# Patient Record
Sex: FEMALE | Race: OTHER | Employment: FULL TIME | URBAN - METROPOLITAN AREA
[De-identification: names, ages, dates, MRNs, and addresses within clinical notes are randomized per-mention and may not be internally consistent; named-entity substitution may affect disease eponyms.]

---

## 2019-04-07 ENCOUNTER — APPOINTMENT (OUTPATIENT)
Dept: GENERAL RADIOLOGY | Age: 34
End: 2019-04-07

## 2019-04-07 ENCOUNTER — HOSPITAL ENCOUNTER (EMERGENCY)
Age: 34
Discharge: HOME | End: 2019-04-07

## 2019-04-07 VITALS
TEMPERATURE: 97.8 F | DIASTOLIC BLOOD PRESSURE: 85 MMHG | WEIGHT: 205 LBS | SYSTOLIC BLOOD PRESSURE: 116 MMHG | HEIGHT: 69 IN | HEART RATE: 71 BPM | RESPIRATION RATE: 13 BRPM | OXYGEN SATURATION: 99 % | BODY MASS INDEX: 30.36 KG/M2

## 2019-04-07 DIAGNOSIS — M54.50 ACUTE EXACERBATION OF CHRONIC LOW BACK PAIN: Primary | ICD-10-CM

## 2019-04-07 DIAGNOSIS — Z86.69 HISTORY OF SCIATICA: ICD-10-CM

## 2019-04-07 DIAGNOSIS — G89.29 ACUTE EXACERBATION OF CHRONIC LOW BACK PAIN: Primary | ICD-10-CM

## 2019-04-07 LAB
BACTERIA: ABNORMAL /HPF
BILIRUBIN URINE: NEGATIVE
BLOOD, URINE: ABNORMAL
CLARITY: ABNORMAL
COLOR: YELLOW
EPITHELIAL CELLS, UA: 2 /HPF (ref 0–5)
GLUCOSE URINE: NEGATIVE MG/DL
HCG(URINE) PREGNANCY TEST: NEGATIVE
HYALINE CASTS: 2 /LPF (ref 0–8)
KETONES, URINE: NEGATIVE MG/DL
LEUKOCYTE ESTERASE, URINE: NEGATIVE
MICROSCOPIC EXAMINATION: YES
NITRITE, URINE: NEGATIVE
PH UA: 8 (ref 5–8)
PROTEIN UA: NEGATIVE MG/DL
RBC UA: 0 /HPF (ref 0–4)
SPECIFIC GRAVITY UA: 1.02 (ref 1–1.03)
URINE REFLEX TO CULTURE: ABNORMAL
URINE TYPE: ABNORMAL
UROBILINOGEN, URINE: 0.2 E.U./DL
WBC UA: 1 /HPF (ref 0–5)

## 2019-04-07 PROCEDURE — 84703 CHORIONIC GONADOTROPIN ASSAY: CPT

## 2019-04-07 PROCEDURE — 72100 X-RAY EXAM L-S SPINE 2/3 VWS: CPT

## 2019-04-07 PROCEDURE — 99283 EMERGENCY DEPT VISIT LOW MDM: CPT

## 2019-04-07 PROCEDURE — 6370000000 HC RX 637 (ALT 250 FOR IP): Performed by: PHYSICIAN ASSISTANT

## 2019-04-07 PROCEDURE — 81001 URINALYSIS AUTO W/SCOPE: CPT

## 2019-04-07 RX ORDER — PREDNISONE 20 MG/1
60 TABLET ORAL ONCE
Status: COMPLETED | OUTPATIENT
Start: 2019-04-07 | End: 2019-04-07

## 2019-04-07 RX ORDER — METHOCARBAMOL 500 MG/1
750 TABLET, FILM COATED ORAL ONCE
Status: COMPLETED | OUTPATIENT
Start: 2019-04-07 | End: 2019-04-07

## 2019-04-07 RX ORDER — LIDOCAINE 4 G/G
1 PATCH TOPICAL DAILY
Status: DISCONTINUED | OUTPATIENT
Start: 2019-04-07 | End: 2019-04-07 | Stop reason: HOSPADM

## 2019-04-07 RX ORDER — HYDROCODONE BITARTRATE AND ACETAMINOPHEN 5; 325 MG/1; MG/1
1 TABLET ORAL ONCE
Status: COMPLETED | OUTPATIENT
Start: 2019-04-07 | End: 2019-04-07

## 2019-04-07 RX ORDER — PREDNISONE 10 MG/1
TABLET ORAL
Qty: 30 TABLET | Refills: 0 | Status: SHIPPED | OUTPATIENT
Start: 2019-04-07 | End: 2019-04-17

## 2019-04-07 RX ORDER — LIDOCAINE 50 MG/G
1 PATCH TOPICAL DAILY
Qty: 30 PATCH | Refills: 0 | Status: SHIPPED | OUTPATIENT
Start: 2019-04-07 | End: 2020-09-09 | Stop reason: ALTCHOICE

## 2019-04-07 RX ORDER — HYDROCODONE BITARTRATE AND ACETAMINOPHEN 5; 325 MG/1; MG/1
1 TABLET ORAL EVERY 6 HOURS PRN
Qty: 8 TABLET | Refills: 0 | Status: SHIPPED | OUTPATIENT
Start: 2019-04-07 | End: 2019-04-09

## 2019-04-07 RX ORDER — METHOCARBAMOL 750 MG/1
750 TABLET, FILM COATED ORAL EVERY 8 HOURS PRN
Qty: 30 TABLET | Refills: 0 | Status: SHIPPED | OUTPATIENT
Start: 2019-04-07 | End: 2019-04-17

## 2019-04-07 RX ADMIN — METHOCARBAMOL TABLETS 750 MG: 500 TABLET, COATED ORAL at 15:11

## 2019-04-07 RX ADMIN — PREDNISONE 60 MG: 20 TABLET ORAL at 15:13

## 2019-04-07 RX ADMIN — HYDROCODONE BITARTRATE AND ACETAMINOPHEN 1 TABLET: 5; 325 TABLET ORAL at 15:13

## 2019-04-07 ASSESSMENT — ENCOUNTER SYMPTOMS
DIARRHEA: 0
VOMITING: 0
ABDOMINAL PAIN: 0
CHEST TIGHTNESS: 0
SHORTNESS OF BREATH: 0
COLOR CHANGE: 0
NAUSEA: 0
BACK PAIN: 1

## 2019-04-07 ASSESSMENT — PAIN SCALES - GENERAL
PAINLEVEL_OUTOF10: 9
PAINLEVEL_OUTOF10: 9

## 2019-04-07 ASSESSMENT — PAIN DESCRIPTION - PAIN TYPE: TYPE: ACUTE PAIN

## 2019-04-07 ASSESSMENT — PAIN DESCRIPTION - LOCATION: LOCATION: BACK

## 2019-04-07 NOTE — ED PROVIDER NOTES
further questioning patient states that she is late on her last menstrual period. She states she is not sure if there is a possibility of pregnancy. Patient goes on to report she is not having fevers and or chills. She is denying hematuria and/or flank pain. She denies frequency urgency and/or any other associated symptoms at the present time. PastMedical/Surgical History:      Diagnosis Date    Anxiety     Chronic back pain greater than 3 months duration     Depression          Procedure Laterality Date     SECTION         Medications:  Previous Medications    ACETAMINOPHEN (APAP EXTRA STRENGTH) 500 MG TABLET    Take 1 tablet by mouth every 6 hours as needed for Pain         Review of Systems:  Review of Systems   Constitutional: Negative for activity change, chills and fever. Respiratory: Negative for chest tightness and shortness of breath. Cardiovascular: Negative for chest pain. Gastrointestinal: Negative for abdominal pain, diarrhea, nausea and vomiting. Genitourinary: Negative for dysuria and flank pain. Musculoskeletal: Positive for back pain and myalgias. Negative for arthralgias, gait problem, joint swelling, neck pain and neck stiffness. Skin: Negative for color change and wound. Neurological: Negative for dizziness, seizures, syncope, numbness and headaches. Positives and Pertinent negatives as per HPI. Except as noted above in the ROS, problem specific ROS was completed and is negative. Physical Exam:  Physical Exam   Constitutional: She is oriented to person, place, and time. She appears well-developed and well-nourished. No distress. HENT:   Head: Normocephalic and atraumatic. Right Ear: External ear normal.   Left Ear: External ear normal.   Eyes: Conjunctivae are normal. Right eye exhibits no discharge. Left eye exhibits no discharge. No scleral icterus. Neck: Normal range of motion. No JVD present. Cardiovascular: Normal rate and regular rhythm. Exam reveals no gallop and no friction rub. No murmur heard. Pulmonary/Chest: Effort normal and breath sounds normal. No accessory muscle usage. No respiratory distress. She has no wheezes. She has no rhonchi. She has no rales. Musculoskeletal:        Lumbar back: She exhibits decreased range of motion, tenderness, bony tenderness and pain. She exhibits no swelling, no edema, no deformity, no laceration, no spasm and normal pulse. Neurological: She is alert and oriented to person, place, and time. She has normal strength. No cranial nerve deficit or sensory deficit. Coordination normal. GCS eye subscore is 4. GCS verbal subscore is 5. GCS motor subscore is 6. Skin: Skin is warm, dry and intact. No rash noted. She is not diaphoretic. Psychiatric: She has a normal mood and affect. Her behavior is normal.   Nursing note and vitals reviewed.       MEDICAL DECISION MAKING    Vitals:    Vitals:    04/07/19 1321   BP: 116/85   Pulse: 71   Resp: 13   Temp: 97.8 °F (36.6 °C)   TempSrc: Infrared   SpO2: 99%   Weight: 205 lb (93 kg)   Height: 5' 9\" (1.753 m)       LABS:  Labs Reviewed   URINE RT REFLEX TO CULTURE - Abnormal; Notable for the following components:       Result Value    Clarity, UA CLOUDY (*)     Blood, Urine SMALL (*)     All other components within normal limits    Narrative:     Performed at:  [facility] [address], [place], [address] [employer]   Phone [phone]   MICROSCOPIC URINALYSIS - Abnormal; Notable for the following components:    Bacteria, UA RARE (*)     All other components within normal limits    Narrative:     Performed at:  [facility] [address], [place], [address] [employer]   Phone [phone]   PREGNANCY, URINE    Narrative:     Performed at:  [facility] [address], [place], [facility] [employer]   Phone [phone] of labs reviewed and werenegative at this time or not returned at the time of this note. RADIOLOGY:   Non-plain film images such as CT, Ultrasound and MRI are read by the radiologist. Ana Wilson PA-C have directly visualized the radiologic plain film image(s) with the below findings:        Interpretation per the Radiologist below, if available at the time of thisnote:    XR LUMBAR SPINE (2-3 VIEWS)   Final Result   Negative study. Xr Lumbar Spine (2-3 Views)    Result Date: 4/7/2019  EXAMINATION: 3 XRAY VIEWS OF THE LUMBAR SPINE 4/7/2019 1:37 pm COMPARISON: None. HISTORY: ORDERING SYSTEM PROVIDED HISTORY: back pain TECHNOLOGIST PROVIDED HISTORY: Reason for exam:->back pain Ordering Physician Provided Reason for Exam: back pain Acuity: Acute Type of Exam: Initial FINDINGS: Vertebral body height and alignment is preserved. Intervertebral disc space height is preserved. No significant degenerative change noted. Soft tissues appear grossly unremarkable. Negative study. MEDICAL DECISION MAKING / ED COURSE:      PROCEDURES:   Procedures  None    Patient was given:     Medications   lidocaine 4 % external patch 1 patch (has no administration in time range)   methocarbamol (ROBAXIN) tablet 750 mg (has no administration in time range)   predniSONE (DELTASONE) tablet 60 mg (has no administration in time range)   HYDROcodone-acetaminophen (NORCO) 5-325 MG per tablet 1 tablet (has no administration in time range)       The patient's detailed history of present illness is documented as above. Upon arrival to the emergency department the patient's vital signs are as documented. The patient is noted to be hemodynamically stable and afebrile. Physical examination findings are as above. I had ordered a urinalysis as well as urine pregnancy test causes of the possibility of pregnancy. Despite that radiograph Saturday been completed and the patient's lumbar spine. UA demonstrates no evidence of infection and she is not noted to be pregnant.

## 2019-04-07 NOTE — ED NOTES
Pt Discharged in stable condition, VSS, no signs of distress, discharge instructions and meds reviewed. Pt verbalizes understanding and states no further questions or concerns unaddressed. Pt taken to car via wheelchair by Ronny Samson. Being driven home by family.      Calvin Varela RN  04/07/19 6023

## 2020-01-06 ENCOUNTER — HOSPITAL ENCOUNTER (EMERGENCY)
Age: 35
Discharge: HOME | End: 2020-01-06

## 2020-01-06 VITALS
BODY MASS INDEX: 27.93 KG/M2 | RESPIRATION RATE: 14 BRPM | WEIGHT: 195.06 LBS | HEART RATE: 73 BPM | SYSTOLIC BLOOD PRESSURE: 128 MMHG | DIASTOLIC BLOOD PRESSURE: 69 MMHG | TEMPERATURE: 97.3 F | HEIGHT: 70 IN

## 2020-01-06 PROCEDURE — 99282 EMERGENCY DEPT VISIT SF MDM: CPT

## 2020-01-06 PROCEDURE — 4500000021 HC ED LEVEL 1 PROCEDURE

## 2020-01-06 NOTE — ED PROVIDER NOTES
**EVALUATED BY ADVANCED PRACTICE PROVIDER**        [address] ENCOUNTER      Pt Name: Jo Garcia  [facility]:[id number]  [place] 1985  Date of evaluation: 2020  Provider: Mil Hurley PA-C      Chief Complaint:    Chief Complaint   Patient presents with    Ear Problem     c/o left ear itching, x 1 week. pt states \" I can't hear out of ear\". Nursing Notes, Past Medical Hx, Past Surgical Hx, Social Hx, Allergies, and Family Hx were all reviewed and agreed with or any disagreements were addressed in the HPI.    HPI:  (Location, Duration, Timing, Severity, Quality, Assoc Sx, Context, Modifying factors)  This is a  36 y.o. female the presents to the emergency department with a chief complaint of left ear itching and decreased hearing out of that ear. She states years ago she had cerumen impaction that had to be removed by her doctor and this feels similar. She was trying to use candle removal for her earwax the other night that got some out. She denies using Q-tips. Denies pain, fevers, vomiting or any other symptoms. PastMedical/Surgical History:      Diagnosis Date    Anxiety     Chronic back pain greater than 3 months duration     Depression          Procedure Laterality Date     SECTION         Medications:  Previous Medications    ACETAMINOPHEN (APAP EXTRA STRENGTH) 500 MG TABLET    Take 1 tablet by mouth every 6 hours as needed for Pain    LIDOCAINE (LIDODERM) 5 %    Place 1 patch onto the skin daily 12 hours on, 12 hours off. MULTIPLE VITAMIN (MULTI-VITAMIN DAILY PO)    Take by mouth         Review of Systems:  Review of Systems  Positives and Pertinent negatives as per HPI. Except as noted above in the ROS, problem specific ROS was completed and is negative. Physical Exam:  Physical Exam  Vitals signs and nursing note reviewed. Constitutional:       Appearance: She is well-developed.  She is not diaphoretic. HENT:      Head: Atraumatic. Right Ear: Tympanic membrane normal. There is no impacted cerumen. Left Ear: Tympanic membrane normal. There is impacted cerumen. Ears:      Comments: Left ear with cerumen impaction. No tenderness to palpate over the tragus or pinna bilaterally or mastoids bilaterally. TM is intact without erythema after cerumen removal.     Nose: Nose normal.   Eyes:      General:         Right eye: No discharge. Left eye: No discharge. Neck:      Musculoskeletal: Normal range of motion. Cardiovascular:      Rate and Rhythm: Normal rate and regular rhythm. Heart sounds: Normal heart sounds. No murmur. No gallop. Pulmonary:      Effort: Pulmonary effort is normal.      Breath sounds: Normal breath sounds. No stridor. No wheezing, rhonchi or rales. Skin:     General: Skin is warm and dry. Findings: No erythema or rash. Neurological:      Mental Status: She is alert and oriented to person, place, and time. Cranial Nerves: No cranial nerve deficit. Psychiatric:         Behavior: Behavior normal.         MEDICAL DECISION MAKING    Vitals:    Vitals:    01/06/20 0812   BP: 128/69   Pulse: 73   Resp: 14   Temp: 97.3 °F (36.3 °C)   Weight: 195 lb 1 oz (88.5 kg)   Height: 5' 9.5\" (1.765 m)       LABS:Labs Reviewed - No data to display     Remainder of labs reviewed and werenegative at this time or not returned at the time of this note. RADIOLOGY:   Non-plain film images such as CT, Ultrasound and MRI are read by the radiologist. Adele Singh PA-C have directly visualized the radiologic plain film image(s) with the below findings:        Interpretation per the Radiologist below, if available at the time of this note:    No orders to display        No results found.       MEDICAL DECISION MAKING / ED COURSE:      PROCEDURES:   Ear Wax Removal  Performed by: Lisa Villegas PA-C  Authorized by: Lisa Villegas PA-C     Consent:

## 2020-09-08 ENCOUNTER — HOSPITAL ENCOUNTER (EMERGENCY)
Age: 35
Discharge: HOME | End: 2020-09-08
Attending: EMERGENCY MEDICINE

## 2020-09-08 ENCOUNTER — APPOINTMENT (OUTPATIENT)
Dept: CT IMAGING | Age: 35
End: 2020-09-08

## 2020-09-08 VITALS
OXYGEN SATURATION: 97 % | SYSTOLIC BLOOD PRESSURE: 149 MMHG | BODY MASS INDEX: 26.63 KG/M2 | HEART RATE: 83 BPM | DIASTOLIC BLOOD PRESSURE: 78 MMHG | HEIGHT: 70 IN | TEMPERATURE: 97.1 F | WEIGHT: 186 LBS | RESPIRATION RATE: 16 BRPM

## 2020-09-08 LAB
A/G RATIO: 1.5 (ref 1.1–2.2)
ALBUMIN SERPL-MCNC: 4.7 G/DL (ref 3.4–5)
ALP BLD-CCNC: 76 U/L (ref 40–129)
ALT SERPL-CCNC: 17 U/L (ref 10–40)
ANION GAP SERPL CALCULATED.3IONS-SCNC: 12 MMOL/L (ref 3–16)
AST SERPL-CCNC: 17 U/L (ref 15–37)
BASOPHILS ABSOLUTE: 0.1 K/UL (ref 0–0.2)
BASOPHILS RELATIVE PERCENT: 0.8 %
BILIRUB SERPL-MCNC: 0.5 MG/DL (ref 0–1)
BILIRUBIN URINE: NEGATIVE
BLOOD, URINE: ABNORMAL
BUN BLDV-MCNC: 7 MG/DL (ref 7–20)
CALCIUM SERPL-MCNC: 9.5 MG/DL (ref 8.3–10.6)
CHLORIDE BLD-SCNC: 105 MMOL/L (ref 99–110)
CLARITY: ABNORMAL
CO2: 20 MMOL/L (ref 21–32)
COLOR: YELLOW
CREAT SERPL-MCNC: 0.8 MG/DL (ref 0.6–1.1)
EOSINOPHILS ABSOLUTE: 0.4 K/UL (ref 0–0.6)
EOSINOPHILS RELATIVE PERCENT: 3.8 %
EPITHELIAL CELLS, UA: 1 /HPF (ref 0–5)
GFR AFRICAN AMERICAN: >60
GFR NON-AFRICAN AMERICAN: >60
GLOBULIN: 3.2 G/DL
GLUCOSE BLD-MCNC: 107 MG/DL (ref 70–99)
GLUCOSE URINE: NEGATIVE MG/DL
HCG QUALITATIVE: NEGATIVE
HCT VFR BLD CALC: 44.5 % (ref 36–48)
HEMOGLOBIN: 15.1 G/DL (ref 12–16)
HYALINE CASTS: 0 /LPF (ref 0–8)
KETONES, URINE: NEGATIVE MG/DL
LEUKOCYTE ESTERASE, URINE: ABNORMAL
LIPASE: 22 U/L (ref 13–60)
LYMPHOCYTES ABSOLUTE: 2.5 K/UL (ref 1–5.1)
LYMPHOCYTES RELATIVE PERCENT: 27.1 %
MCH RBC QN AUTO: 32.4 PG (ref 26–34)
MCHC RBC AUTO-ENTMCNC: 33.9 G/DL (ref 31–36)
MCV RBC AUTO: 95.6 FL (ref 80–100)
MICROSCOPIC EXAMINATION: YES
MONOCYTES ABSOLUTE: 0.7 K/UL (ref 0–1.3)
MONOCYTES RELATIVE PERCENT: 7.8 %
NEUTROPHILS ABSOLUTE: 5.7 K/UL (ref 1.7–7.7)
NEUTROPHILS RELATIVE PERCENT: 60.5 %
NITRITE, URINE: NEGATIVE
PDW BLD-RTO: 14.4 % (ref 12.4–15.4)
PH UA: 6 (ref 5–8)
PLATELET # BLD: 306 K/UL (ref 135–450)
PMV BLD AUTO: 9.2 FL (ref 5–10.5)
POTASSIUM SERPL-SCNC: 4.3 MMOL/L (ref 3.5–5.1)
PROTEIN UA: NEGATIVE MG/DL
RBC # BLD: 4.65 M/UL (ref 4–5.2)
RBC UA: 1 /HPF (ref 0–4)
SODIUM BLD-SCNC: 137 MMOL/L (ref 136–145)
SPECIFIC GRAVITY UA: 1.01 (ref 1–1.03)
TOTAL PROTEIN: 7.9 G/DL (ref 6.4–8.2)
URINE REFLEX TO CULTURE: YES
URINE TYPE: ABNORMAL
UROBILINOGEN, URINE: 0.2 E.U./DL
WBC # BLD: 9.3 K/UL (ref 4–11)
WBC UA: 10 /HPF (ref 0–5)

## 2020-09-08 PROCEDURE — 83690 ASSAY OF LIPASE: CPT

## 2020-09-08 PROCEDURE — 87086 URINE CULTURE/COLONY COUNT: CPT

## 2020-09-08 PROCEDURE — 6370000000 HC RX 637 (ALT 250 FOR IP): Performed by: EMERGENCY MEDICINE

## 2020-09-08 PROCEDURE — 84703 CHORIONIC GONADOTROPIN ASSAY: CPT

## 2020-09-08 PROCEDURE — 85025 COMPLETE CBC W/AUTO DIFF WBC: CPT

## 2020-09-08 PROCEDURE — 81001 URINALYSIS AUTO W/SCOPE: CPT

## 2020-09-08 PROCEDURE — 99284 EMERGENCY DEPT VISIT MOD MDM: CPT

## 2020-09-08 PROCEDURE — 6360000002 HC RX W HCPCS: Performed by: EMERGENCY MEDICINE

## 2020-09-08 PROCEDURE — 96374 THER/PROPH/DIAG INJ IV PUSH: CPT

## 2020-09-08 PROCEDURE — C9113 INJ PANTOPRAZOLE SODIUM, VIA: HCPCS | Performed by: EMERGENCY MEDICINE

## 2020-09-08 PROCEDURE — 2580000003 HC RX 258: Performed by: EMERGENCY MEDICINE

## 2020-09-08 PROCEDURE — 6360000004 HC RX CONTRAST MEDICATION: Performed by: EMERGENCY MEDICINE

## 2020-09-08 PROCEDURE — 96375 TX/PRO/DX INJ NEW DRUG ADDON: CPT

## 2020-09-08 PROCEDURE — 74177 CT ABD & PELVIS W/CONTRAST: CPT

## 2020-09-08 PROCEDURE — 80053 COMPREHEN METABOLIC PANEL: CPT

## 2020-09-08 RX ORDER — PANTOPRAZOLE SODIUM 40 MG/10ML
40 INJECTION, POWDER, LYOPHILIZED, FOR SOLUTION INTRAVENOUS ONCE
Status: COMPLETED | OUTPATIENT
Start: 2020-09-08 | End: 2020-09-08

## 2020-09-08 RX ORDER — HYDROCODONE BITARTRATE AND ACETAMINOPHEN 5; 325 MG/1; MG/1
1 TABLET ORAL EVERY 4 HOURS PRN
Qty: 18 TABLET | Refills: 0 | Status: ON HOLD | OUTPATIENT
Start: 2020-09-08 | End: 2020-09-15 | Stop reason: HOSPADM

## 2020-09-08 RX ORDER — ONDANSETRON 2 MG/ML
4 INJECTION INTRAMUSCULAR; INTRAVENOUS ONCE
Status: COMPLETED | OUTPATIENT
Start: 2020-09-08 | End: 2020-09-08

## 2020-09-08 RX ORDER — 0.9 % SODIUM CHLORIDE 0.9 %
1000 INTRAVENOUS SOLUTION INTRAVENOUS ONCE
Status: COMPLETED | OUTPATIENT
Start: 2020-09-08 | End: 2020-09-08

## 2020-09-08 RX ORDER — CIPROFLOXACIN 500 MG/1
500 TABLET, FILM COATED ORAL 2 TIMES DAILY
Qty: 14 TABLET | Refills: 0 | Status: ON HOLD | OUTPATIENT
Start: 2020-09-08 | End: 2020-09-15 | Stop reason: HOSPADM

## 2020-09-08 RX ORDER — ONDANSETRON 4 MG/1
4 TABLET, ORALLY DISINTEGRATING ORAL EVERY 8 HOURS PRN
Qty: 20 TABLET | Refills: 0 | Status: SHIPPED | OUTPATIENT
Start: 2020-09-08

## 2020-09-08 RX ORDER — OMEPRAZOLE 20 MG/1
20 CAPSULE, DELAYED RELEASE ORAL DAILY
Qty: 20 CAPSULE | Refills: 0 | Status: SHIPPED | OUTPATIENT
Start: 2020-09-08 | End: 2020-09-28

## 2020-09-08 RX ORDER — METRONIDAZOLE 500 MG/1
500 TABLET ORAL 3 TIMES DAILY
Qty: 30 TABLET | Refills: 0 | Status: ON HOLD | OUTPATIENT
Start: 2020-09-08 | End: 2020-09-15 | Stop reason: HOSPADM

## 2020-09-08 RX ADMIN — SODIUM CHLORIDE 1000 ML: 9 INJECTION, SOLUTION INTRAVENOUS at 13:32

## 2020-09-08 RX ADMIN — IOPAMIDOL 75 ML: 755 INJECTION, SOLUTION INTRAVENOUS at 14:02

## 2020-09-08 RX ADMIN — ONDANSETRON 4 MG: 2 INJECTION INTRAMUSCULAR; INTRAVENOUS at 13:33

## 2020-09-08 RX ADMIN — LIDOCAINE HYDROCHLORIDE: 20 SOLUTION ORAL; TOPICAL at 13:35

## 2020-09-08 RX ADMIN — PANTOPRAZOLE SODIUM 40 MG: 40 INJECTION, POWDER, FOR SOLUTION INTRAVENOUS at 13:34

## 2020-09-08 ASSESSMENT — PAIN DESCRIPTION - LOCATION: LOCATION: ABDOMEN

## 2020-09-08 ASSESSMENT — PAIN DESCRIPTION - PAIN TYPE: TYPE: ACUTE PAIN

## 2020-09-08 ASSESSMENT — PAIN SCALES - GENERAL: PAINLEVEL_OUTOF10: 6

## 2020-09-08 ASSESSMENT — PAIN DESCRIPTION - DESCRIPTORS: DESCRIPTORS: CRAMPING

## 2020-09-08 ASSESSMENT — PAIN DESCRIPTION - FREQUENCY: FREQUENCY: INTERMITTENT

## 2020-09-08 NOTE — ED NOTES
Bed: 27  Expected date:   Expected time:   Means of arrival:   Comments:  Marcelino Tobias, RN  09/08/20 6157

## 2020-09-08 NOTE — ED PROVIDER NOTES
EMERGENCY DEPARTMENT PROVIDER NOTE    Patient Identification  Pt Name: Maryann Yancey  MRN: [id number]  [place] 1985  Date of evaluation: 2020  Provider: Fidel Gonzales DO  PCP: Jhonny Sheldon MD    Chief Complaint  Abdominal Cramping (c/o abd cramping, N/V/D on and off for 5 months worsening x1 week )      HPI  (History provided by patient)  This is a 30 y.o. female otherwise healthy who was brought in by self for upper abdominal pain ongoing intermittently for the past 5 months. Nothing seems to make the pain acutely better or worse. Describes it as a gas-like sensation. Radiates into her lower abdomen. Severity moderate. Associated with nausea, vomiting and diarrhea. This is nonbloody. She denies any fevers, chills, chest pain, shortness of breath, vaginal discharge or other  symptoms. .     ROS    Const:  No fevers, no chills, no generalized weakness  Skin:  No rash, no lesions  Eyes:  No visual changes, no blurry or double vision, no pain  ENT:  No sore throat, no difficulty swallowing, no ear pan, no sinus pain or congestion  Card:  No chest pain, no palpitations, no edema  Resp:  No shortness of breath, no cough, no wheezing  Abd:  +abdominal pain, +nausea, +vomiting, +diarrhea  Genitourinary:  No dysuria, no hematuria, no vaginal discharge, no vaginal bleeding  MSK:  No joint pain, no myalgia  Neuro:  No focal weakness, no headache, no paresthesia    All other systems reviewed and negative unless otherwise noted in HPI      I have reviewed the following nursing documentation:  Allergies: Ibuprofen    Past medical history:   Past Medical History:   Diagnosis Date    Anxiety     Chronic back pain greater than 3 months duration     Depression      Past surgical history:   Past Surgical History:   Procedure Laterality Date     SECTION         Home medications:   Discharge Medication List as of 2020  4:18 PM      CONTINUE these medications which have NOT CHANGED Details   Multiple Vitamin (MULTI-VITAMIN DAILY PO) Take by mouthHistorical Med      lidocaine (LIDODERM) 5 % Place 1 patch onto the skin daily 12 hours on, 12 hours off., Disp-30 patch, R-0Print      acetaminophen (APAP EXTRA STRENGTH) 500 MG tablet Take 1 tablet by mouth every 6 hours as needed for Pain, Disp-30 tablet, R-0Print             Social history:  reports that she has been smoking cigarettes. She has a 5.00 pack-year smoking history. She uses smokeless tobacco. She reports current alcohol use. She reports that she does not use drugs. Family history:    Family History   Problem Relation Age of Onset   Orion Aguilar Depression Mother     Diabetes Mother     Depression Sister     Substance Abuse Sister     Substance Abuse Brother     Depression Maternal Aunt     Mental Illness Maternal Aunt     Substance Abuse Maternal Uncle     Substance Abuse Paternal Uncle     High Blood Pressure Maternal Grandfather     High Cholesterol Maternal Grandfather     Stroke Maternal Grandfather     Cancer Paternal Grandmother     Diabetes Paternal Grandmother     High Blood Pressure Paternal Grandmother     High Cholesterol Paternal Grandmother     Stroke Paternal Grandmother     Arthritis Paternal Grandfather     Heart Disease Paternal Grandfather     High Blood Pressure Paternal Grandfather     High Cholesterol Paternal Lindsey Saliva Stroke Paternal Grandfather          Exam  ED Triage Vitals [09/08/20 1226]   BP Temp Temp Source Pulse Resp SpO2 Height Weight   (!) 142/93 97.1 °F (36.2 °C) Tympanic 83 16 99 % 5' 9.5\" (1.765 m) 186 lb (84.4 kg)     Nursing note and vitals reviewed. Constitutional: Well developed, well nourished. Non-toxic in appearance. HENT:      Head: Normocephalic and atraumatic. Ears: External ears normal.      Nose: Nose normal.     Mouth: Membrane mucosa moist and pink. Eyes: Anicteric sclera. No discharge. Neck: Supple. Trachea midline.    Cardiovascular: RRR; no murmurs, rubs, or gallops. Pulmonary/Chest: Effort normal. No respiratory distress. CTAB. No stridor. No wheezes. No rales. Abdominal: Soft. No distension. Tenderness to palpation bilateral upper quadrants and suprapubic region. No focal right lower quadrant tenderness or Rovsing. Negative Perkins. No rebound, rigidity, no guarding. Musculoskeletal: Moves all extremities. No gross deformity. Neurological: Alert and oriented. Face symmetric. Speech is clear. Skin: Warm and dry. No rash. Psychiatric: Normal mood and affect. Behavior is normal.      Radiology  CT ABDOMEN PELVIS W IV CONTRAST Additional Contrast? None   Final Result   Masslike area at the ileocecal valve and adjacent cecum. This may either be   due to malignancy or focal infection. Adjacent metastatic versus   reactive/inflammatory mildly enlarged lymph nodes. Colonoscopy recommended. Indeterminate low-density lesion at the dome of the liver which may be a   metastatic focus. Hemangioma also in the differential diagnosis. If further   imaging evaluation is needed a pre and post contrast liver mass protocol MRI   examination would be recommended.              Labs  Results for orders placed or performed during the hospital encounter of 09/08/20   CBC Auto Differential   Result Value Ref Range    WBC 9.3 4.0 - 11.0 K/uL    RBC 4.65 4.00 - 5.20 M/uL    Hemoglobin 15.1 12.0 - 16.0 g/dL    Hematocrit 44.5 36.0 - 48.0 %    MCV 95.6 80.0 - 100.0 fL    MCH 32.4 26.0 - 34.0 pg    MCHC 33.9 31.0 - 36.0 g/dL    RDW 14.4 12.4 - 15.4 %    Platelets 127 742 - 858 K/uL    MPV 9.2 5.0 - 10.5 fL    Neutrophils % 60.5 %    Lymphocytes % 27.1 %    Monocytes % 7.8 %    Eosinophils % 3.8 %    Basophils % 0.8 %    Neutrophils Absolute 5.7 1.7 - 7.7 K/uL    Lymphocytes Absolute 2.5 1.0 - 5.1 K/uL    Monocytes Absolute 0.7 0.0 - 1.3 K/uL    Eosinophils Absolute 0.4 0.0 - 0.6 K/uL    Basophils Absolute 0.1 0.0 - 0.2 K/uL   Comprehensive Metabolic Panel   Result Value Ref Range    Sodium 137 136 - 145 mmol/L    Potassium 4.3 3.5 - 5.1 mmol/L    Chloride 105 99 - 110 mmol/L    CO2 20 (L) 21 - 32 mmol/L    Anion Gap 12 3 - 16    Glucose 107 (H) 70 - 99 mg/dL    BUN 7 7 - 20 mg/dL    CREATININE 0.8 0.6 - 1.1 mg/dL    GFR Non-African American >60 >60    GFR African American >60 >60    Calcium 9.5 8.3 - 10.6 mg/dL    Total Protein 7.9 6.4 - 8.2 g/dL    Alb 4.7 3.4 - 5.0 g/dL    Albumin/Globulin Ratio 1.5 1.1 - 2.2    Total Bilirubin 0.5 0.0 - 1.0 mg/dL    Alkaline Phosphatase 76 40 - 129 U/L    ALT 17 10 - 40 U/L    AST 17 15 - 37 U/L    Globulin 3.2 g/dL   Lipase   Result Value Ref Range    Lipase 22.0 13.0 - 60.0 U/L   Urinalysis Reflex to Culture    Specimen: Urine, clean catch   Result Value Ref Range    Color, UA YELLOW Straw/Yellow    Clarity, UA CLOUDY (A) Clear    Glucose, Ur Negative Negative mg/dL    Bilirubin Urine Negative Negative    Ketones, Urine Negative Negative mg/dL    Specific Gravity, UA 1.011 1.005 - 1.030    Blood, Urine TRACE (A) Negative    pH, UA 6.0 5.0 - 8.0    Protein, UA Negative Negative mg/dL    Urobilinogen, Urine 0.2 <2.0 E.U./dL    Nitrite, Urine Negative Negative    Leukocyte Esterase, Urine SMALL (A) Negative    Microscopic Examination YES     Urine Type NotGiven     Urine Reflex to Culture Yes    HCG Qualitative, Serum   Result Value Ref Range    hCG Qual Negative Detects HCG level >10 MIU/mL   Microscopic Urinalysis   Result Value Ref Range    Hyaline Casts, UA 0 0 - 8 /LPF    WBC, UA 10 (H) 0 - 5 /HPF    RBC, UA 1 0 - 4 /HPF    Epithelial Cells, UA 1 0 - 5 /HPF       Screenings           MDM and ED Course    Patient afebrile and nontoxic. No distress. No peritoneal signs on abdominal exam.  No focal right lower quadrant tenderness or Rovsing to suggest acute appendicitis and this diagnosis felt unlikely. Lab work-up reassuring, no evidence of endorgan damage or clinically significant electrolyte abnormalities. Pregnancy negative.   CT scan without evidence of obstruction or perforation, however with abnormal appearance of the cecum potentially related to infectious etiology or mass. Patient is not septic. Urinalysis not clearly indicative of infection. Patient did feel improved after ED treatment, she was able to tolerate p.o. fluids. I held discussion at length with patient regarding her symptoms and the results of her CT scan today, recommend close follow-up with her PCP as well as for GI for colonoscopy to exclude potential mass which may be related to malignancy. Will treat with Cipro and Flagyl due to potential infection. Patient does request that she not be admitted to the hospital today. Her pain is been ongoing for 5 months and I do feel she is safe for discharge to self-care with this follow-up plan and she is agreeable. Urgent referral to GI was placed. Strict immediate return precautions were discussed with patient at length and she verbalized her understanding. Final Impression  1. Chronic bilateral upper abdominal pain    2. Nausea    3. Cecum mass        Blood pressure (!) 149/78, pulse 83, temperature 97.1 °F (36.2 °C), temperature source Tympanic, resp. rate 16, height 5' 9.5\" (1.765 m), weight 186 lb (84.4 kg), SpO2 97 %, unknown if currently breastfeeding. Disposition:  DISPOSITION Decision To Discharge 09/08/2020 04:05:57 PM      Patient Referrals:  Izzy Medina MD  [id number] [place] [address] [address] [place] [address],[address] [facility] [phone]    In 2 days  For your general medical care and reevaluation of your abdominal pain    Martina Francis MD  [address] [place] [address]    In 3 days  Follow-up for evaluation of your cecal mass (mass on your large intestine) and potential need for colonoscopy.       Discharge Medications:  Discharge Medication List as of 9/8/2020  4:18 PM      START taking these medications    Details   HYDROcodone-acetaminophen (NORCO) 5-325 MG per tablet Take 1 tablet by mouth every 4 hours as needed for Pain for up to 3 days. Intended supply: 3 days. Take lowest dose possible to manage pain, Disp-18 tablet,R-0Print      ciprofloxacin (CIPRO) 500 MG tablet Take 1 tablet by mouth 2 times daily for 7 days, Disp-14 tablet,R-0Print      metroNIDAZOLE (FLAGYL) 500 MG tablet Take 1 tablet by mouth 3 times daily for 10 days, Disp-30 tablet,R-0Print      omeprazole (PRILOSEC) 20 MG delayed release capsule Take 1 capsule by mouth Daily for 20 days, Disp-20 capsule,R-0Print      ondansetron (ZOFRAN-ODT) 4 MG disintegrating tablet Take 1 tablet by mouth every 8 hours as needed for Nausea or Vomiting, Disp-20 tablet,R-0Print             Discontinued Medications:  Discharge Medication List as of 9/8/2020  4:18 PM          This chart was generated using the [address] [place] [address] [address] [employer] system. I created this record but it may contain dictation errors given the limitations of this technology.     Cally Whitaker DO (electronically signed)  Attending Emergency Physician       Cally Whitaker DO  09/08/20 9340

## 2020-09-09 ENCOUNTER — HOSPITAL ENCOUNTER (INPATIENT)
Age: 35
LOS: 5 days | Discharge: HOME | DRG: 330 | End: 2020-09-15
Attending: EMERGENCY MEDICINE | Admitting: HOSPITALIST
Payer: MEDICAID

## 2020-09-09 PROBLEM — R10.9 ABDOMINAL PAIN: Status: ACTIVE | Noted: 2020-09-09

## 2020-09-09 LAB
A/G RATIO: 1.3 (ref 1.1–2.2)
ALBUMIN SERPL-MCNC: 4.4 G/DL (ref 3.4–5)
ALP BLD-CCNC: 72 U/L (ref 40–129)
ALT SERPL-CCNC: 16 U/L (ref 10–40)
ANION GAP SERPL CALCULATED.3IONS-SCNC: 11 MMOL/L (ref 3–16)
AST SERPL-CCNC: 20 U/L (ref 15–37)
BASOPHILS ABSOLUTE: 0.1 K/UL (ref 0–0.2)
BASOPHILS RELATIVE PERCENT: 0.7 %
BILIRUB SERPL-MCNC: 0.5 MG/DL (ref 0–1)
BILIRUBIN URINE: NEGATIVE
BLOOD, URINE: ABNORMAL
BUN BLDV-MCNC: 5 MG/DL (ref 7–20)
CALCIUM SERPL-MCNC: 9.5 MG/DL (ref 8.3–10.6)
CHLORIDE BLD-SCNC: 104 MMOL/L (ref 99–110)
CLARITY: ABNORMAL
CO2: 22 MMOL/L (ref 21–32)
COLOR: YELLOW
CREAT SERPL-MCNC: 0.8 MG/DL (ref 0.6–1.1)
EOSINOPHILS ABSOLUTE: 0.3 K/UL (ref 0–0.6)
EOSINOPHILS RELATIVE PERCENT: 2.8 %
EPITHELIAL CELLS, UA: 2 /HPF (ref 0–5)
GFR AFRICAN AMERICAN: >60
GFR NON-AFRICAN AMERICAN: >60
GLOBULIN: 3.3 G/DL
GLUCOSE BLD-MCNC: 106 MG/DL (ref 70–99)
GLUCOSE URINE: NEGATIVE MG/DL
HCG QUALITATIVE: NEGATIVE
HCT VFR BLD CALC: 43.1 % (ref 36–48)
HEMOGLOBIN: 14.3 G/DL (ref 12–16)
HYALINE CASTS: 1 /LPF (ref 0–8)
KETONES, URINE: NEGATIVE MG/DL
LEUKOCYTE ESTERASE, URINE: ABNORMAL
LIPASE: 25 U/L (ref 13–60)
LYMPHOCYTES ABSOLUTE: 2.2 K/UL (ref 1–5.1)
LYMPHOCYTES RELATIVE PERCENT: 18.3 %
MCH RBC QN AUTO: 32.1 PG (ref 26–34)
MCHC RBC AUTO-ENTMCNC: 33.1 G/DL (ref 31–36)
MCV RBC AUTO: 96.8 FL (ref 80–100)
MICROSCOPIC EXAMINATION: YES
MONOCYTES ABSOLUTE: 0.7 K/UL (ref 0–1.3)
MONOCYTES RELATIVE PERCENT: 5.6 %
NEUTROPHILS ABSOLUTE: 8.7 K/UL (ref 1.7–7.7)
NEUTROPHILS RELATIVE PERCENT: 72.6 %
NITRITE, URINE: NEGATIVE
PDW BLD-RTO: 14.3 % (ref 12.4–15.4)
PH UA: 6 (ref 5–8)
PLATELET # BLD: 312 K/UL (ref 135–450)
PMV BLD AUTO: 9.2 FL (ref 5–10.5)
POTASSIUM SERPL-SCNC: 4 MMOL/L (ref 3.5–5.1)
PROTEIN UA: NEGATIVE MG/DL
RBC # BLD: 4.45 M/UL (ref 4–5.2)
RBC UA: 1 /HPF (ref 0–4)
SODIUM BLD-SCNC: 137 MMOL/L (ref 136–145)
SPECIFIC GRAVITY UA: 1.01 (ref 1–1.03)
TOTAL PROTEIN: 7.7 G/DL (ref 6.4–8.2)
URINE CULTURE, ROUTINE: NORMAL
URINE REFLEX TO CULTURE: ABNORMAL
URINE TYPE: ABNORMAL
UROBILINOGEN, URINE: 0.2 E.U./DL
WBC # BLD: 12 K/UL (ref 4–11)
WBC UA: 9 /HPF (ref 0–5)

## 2020-09-09 PROCEDURE — 83690 ASSAY OF LIPASE: CPT

## 2020-09-09 PROCEDURE — 84703 CHORIONIC GONADOTROPIN ASSAY: CPT

## 2020-09-09 PROCEDURE — 6370000000 HC RX 637 (ALT 250 FOR IP): Performed by: HOSPITALIST

## 2020-09-09 PROCEDURE — 96375 TX/PRO/DX INJ NEW DRUG ADDON: CPT

## 2020-09-09 PROCEDURE — 80053 COMPREHEN METABOLIC PANEL: CPT

## 2020-09-09 PROCEDURE — 6360000002 HC RX W HCPCS: Performed by: PHYSICIAN ASSISTANT

## 2020-09-09 PROCEDURE — 6370000000 HC RX 637 (ALT 250 FOR IP): Performed by: INTERNAL MEDICINE

## 2020-09-09 PROCEDURE — 6360000002 HC RX W HCPCS: Performed by: HOSPITALIST

## 2020-09-09 PROCEDURE — 99285 EMERGENCY DEPT VISIT HI MDM: CPT

## 2020-09-09 PROCEDURE — 96376 TX/PRO/DX INJ SAME DRUG ADON: CPT

## 2020-09-09 PROCEDURE — 2580000003 HC RX 258: Performed by: HOSPITALIST

## 2020-09-09 PROCEDURE — 96372 THER/PROPH/DIAG INJ SC/IM: CPT

## 2020-09-09 PROCEDURE — 85025 COMPLETE CBC W/AUTO DIFF WBC: CPT

## 2020-09-09 PROCEDURE — 81001 URINALYSIS AUTO W/SCOPE: CPT

## 2020-09-09 PROCEDURE — 2580000003 HC RX 258: Performed by: PHYSICIAN ASSISTANT

## 2020-09-09 PROCEDURE — G0378 HOSPITAL OBSERVATION PER HR: HCPCS

## 2020-09-09 PROCEDURE — 96374 THER/PROPH/DIAG INJ IV PUSH: CPT

## 2020-09-09 RX ORDER — PANTOPRAZOLE SODIUM 40 MG/1
40 TABLET, DELAYED RELEASE ORAL
Status: DISCONTINUED | OUTPATIENT
Start: 2020-09-10 | End: 2020-09-15 | Stop reason: HOSPADM

## 2020-09-09 RX ORDER — ACETAMINOPHEN 650 MG/1
650 SUPPOSITORY RECTAL EVERY 6 HOURS PRN
Status: DISCONTINUED | OUTPATIENT
Start: 2020-09-09 | End: 2020-09-13

## 2020-09-09 RX ORDER — CIPROFLOXACIN 500 MG/1
500 TABLET, FILM COATED ORAL 2 TIMES DAILY
Status: DISCONTINUED | OUTPATIENT
Start: 2020-09-09 | End: 2020-09-10 | Stop reason: SDUPTHER

## 2020-09-09 RX ORDER — PEG-3350, SODIUM SULFATE, SODIUM CHLORIDE, POTASSIUM CHLORIDE, SODIUM ASCORBATE AND ASCORBIC ACID 7.5-2.691G
100 KIT ORAL ONCE
Status: COMPLETED | OUTPATIENT
Start: 2020-09-09 | End: 2020-09-09

## 2020-09-09 RX ORDER — SODIUM CHLORIDE 0.9 % (FLUSH) 0.9 %
10 SYRINGE (ML) INJECTION PRN
Status: DISCONTINUED | OUTPATIENT
Start: 2020-09-09 | End: 2020-09-15 | Stop reason: HOSPADM

## 2020-09-09 RX ORDER — PEG-3350, SODIUM SULFATE, SODIUM CHLORIDE, POTASSIUM CHLORIDE, SODIUM ASCORBATE AND ASCORBIC ACID 7.5-2.691G
100 KIT ORAL ONCE
Status: DISCONTINUED | OUTPATIENT
Start: 2020-09-09 | End: 2020-09-09

## 2020-09-09 RX ORDER — ONDANSETRON 2 MG/ML
4 INJECTION INTRAMUSCULAR; INTRAVENOUS EVERY 30 MIN PRN
Status: DISCONTINUED | OUTPATIENT
Start: 2020-09-09 | End: 2020-09-11

## 2020-09-09 RX ORDER — MORPHINE SULFATE 4 MG/ML
4 INJECTION, SOLUTION INTRAMUSCULAR; INTRAVENOUS EVERY 30 MIN PRN
Status: DISCONTINUED | OUTPATIENT
Start: 2020-09-09 | End: 2020-09-10

## 2020-09-09 RX ORDER — HYDROCODONE BITARTRATE AND ACETAMINOPHEN 5; 325 MG/1; MG/1
1 TABLET ORAL EVERY 4 HOURS PRN
Status: DISCONTINUED | OUTPATIENT
Start: 2020-09-09 | End: 2020-09-11

## 2020-09-09 RX ORDER — ONDANSETRON 2 MG/ML
4 INJECTION INTRAMUSCULAR; INTRAVENOUS EVERY 6 HOURS PRN
Status: DISCONTINUED | OUTPATIENT
Start: 2020-09-09 | End: 2020-09-11

## 2020-09-09 RX ORDER — SODIUM CHLORIDE 0.9 % (FLUSH) 0.9 %
10 SYRINGE (ML) INJECTION EVERY 12 HOURS SCHEDULED
Status: DISCONTINUED | OUTPATIENT
Start: 2020-09-09 | End: 2020-09-15 | Stop reason: HOSPADM

## 2020-09-09 RX ORDER — PROMETHAZINE HYDROCHLORIDE 25 MG/1
12.5 TABLET ORAL EVERY 6 HOURS PRN
Status: DISCONTINUED | OUTPATIENT
Start: 2020-09-09 | End: 2020-09-15 | Stop reason: HOSPADM

## 2020-09-09 RX ORDER — METRONIDAZOLE 250 MG/1
500 TABLET ORAL 3 TIMES DAILY
Status: DISCONTINUED | OUTPATIENT
Start: 2020-09-09 | End: 2020-09-10 | Stop reason: SDUPTHER

## 2020-09-09 RX ORDER — ACETAMINOPHEN 325 MG/1
650 TABLET ORAL EVERY 6 HOURS PRN
Status: DISCONTINUED | OUTPATIENT
Start: 2020-09-09 | End: 2020-09-13

## 2020-09-09 RX ORDER — SODIUM CHLORIDE, SODIUM LACTATE, POTASSIUM CHLORIDE, CALCIUM CHLORIDE 600; 310; 30; 20 MG/100ML; MG/100ML; MG/100ML; MG/100ML
1000 INJECTION, SOLUTION INTRAVENOUS ONCE
Status: COMPLETED | OUTPATIENT
Start: 2020-09-09 | End: 2020-09-09

## 2020-09-09 RX ORDER — POLYETHYLENE GLYCOL 3350 17 G/17G
17 POWDER, FOR SOLUTION ORAL DAILY PRN
Status: DISCONTINUED | OUTPATIENT
Start: 2020-09-09 | End: 2020-09-15 | Stop reason: HOSPADM

## 2020-09-09 RX ADMIN — ENOXAPARIN SODIUM 40 MG: 40 INJECTION SUBCUTANEOUS at 22:59

## 2020-09-09 RX ADMIN — POLYETHYLENE GLYCOL 3350, SODIUM SULFATE, SODIUM CHLORIDE, POTASSIUM CHLORIDE, ASCORBIC ACID, SODIUM ASCORBATE 100 G: KIT at 19:00

## 2020-09-09 RX ADMIN — METRONIDAZOLE 500 MG: 250 TABLET ORAL at 17:58

## 2020-09-09 RX ADMIN — METRONIDAZOLE 500 MG: 250 TABLET ORAL at 22:59

## 2020-09-09 RX ADMIN — Medication 10 ML: at 19:40

## 2020-09-09 RX ADMIN — POLYETHYLENE GLYCOL 3350, SODIUM SULFATE, SODIUM CHLORIDE, POTASSIUM CHLORIDE, ASCORBIC ACID, SODIUM ASCORBATE 100 G: KIT at 20:50

## 2020-09-09 RX ADMIN — ONDANSETRON 4 MG: 2 INJECTION INTRAMUSCULAR; INTRAVENOUS at 14:55

## 2020-09-09 RX ADMIN — MORPHINE SULFATE 4 MG: 4 INJECTION INTRAVENOUS at 14:55

## 2020-09-09 RX ADMIN — MORPHINE SULFATE 4 MG: 4 INJECTION INTRAVENOUS at 20:51

## 2020-09-09 RX ADMIN — CIPROFLOXACIN 500 MG: 500 TABLET, FILM COATED ORAL at 22:59

## 2020-09-09 RX ADMIN — SODIUM CHLORIDE, POTASSIUM CHLORIDE, SODIUM LACTATE AND CALCIUM CHLORIDE 1000 ML: 600; 310; 30; 20 INJECTION, SOLUTION INTRAVENOUS at 14:53

## 2020-09-09 RX ADMIN — ONDANSETRON 4 MG: 2 INJECTION INTRAMUSCULAR; INTRAVENOUS at 19:40

## 2020-09-09 ASSESSMENT — PAIN SCALES - GENERAL
PAINLEVEL_OUTOF10: 10
PAINLEVEL_OUTOF10: 10
PAINLEVEL_OUTOF10: 6
PAINLEVEL_OUTOF10: 10

## 2020-09-09 ASSESSMENT — PAIN DESCRIPTION - LOCATION: LOCATION: ABDOMEN

## 2020-09-09 ASSESSMENT — PAIN DESCRIPTION - PAIN TYPE: TYPE: ACUTE PAIN

## 2020-09-09 NOTE — ED NOTES
Pt reports she had emesis x 1  Pt updated on plan of care  Saline lock inserted, skin warm dry and pink. No s/s of distress noted.        Dhara Ambrosio RN  09/09/20 0169

## 2020-09-09 NOTE — ED NOTES
Bed: 10  Expected date:   Expected time:   Means of arrival: Walk In  Comments:     Renea Walton RN  09/09/20 3963

## 2020-09-09 NOTE — ED NOTES
Pt belongongs packed up and pt on stretcher w pt, see vital signs, pt awake oriented pain abdomen 101/10 severe cramping, pt updated on transfer to , questions answered      Marcin Slade RN  09/09/20 7608

## 2020-09-09 NOTE — H&P
HOSPITALISTS HISTORY AND PHYSICAL    9/9/2020 2:23 PM    Patient Information:  Lela Henderson is a 30 y.o. female [id number]  PCP:  Zoya Sifuentes MD (Tel: [phone] )    Chief complaint:    Chief Complaint   Patient presents with    Abdominal Pain     pt with c/o abd pain- states was seen here yesterday for same, told if worse come back- states it's worse. History of Present Illness:  Ashley Cardona is a 30 y.o. female who presented with abdominal pain. On and off for past 5 months worse over the past 3 days. Describes fullness with mid abdominal pain radiating to her lower abdomen. Associated with some nausea vomiting and diarrhea. No bleeding. Patient was seen here yesterday for similar issue. CT scan obtained questionable mass versus inflammation. Was sent home on Cipro Flagyl and pain medication. Patient said did not get any better. Return back due to worsening pain. No history of weight loss. No history of cancer. Does not have any PCP no other medical issues. REVIEW OF SYSTEMS:   Constitutional: Negative for fever,chills or night sweats  ENT: Negative for rhinorrhea, epistaxis, hoarseness, sore throat. Respiratory: Negative for shortness of breath,wheezing  Cardiovascular: Negative for chest pain, palpitations   Gastrointestinal: Negative for nausea, vomiting, diarrhea  Genitourinary: Negative for polyuria, dysuria   Hematologic/Lymphatic: Negative for bleeding tendency, easy bruising  Musculoskeletal: Negative for myalgias and arthralgias  Neurologic: Negative for confusion,dysarthria. Skin: Negative for itching,rash, good capillary refill. Psychiatric: Negative for depression,anxiety, agitation. Endocrine: Negative for polydipsia,polyuria,heat /cold intolerance. Past Medical History:   has a past medical history of Anxiety, Chronic back pain greater than 3 months duration, and Depression.      Past Surgical History:   has a past surgical history that includes  section. Medications:  No current facility-administered medications on file prior to encounter. Current Outpatient Medications on File Prior to Encounter   Medication Sig Dispense Refill    HYDROcodone-acetaminophen (NORCO) 5-325 MG per tablet Take 1 tablet by mouth every 4 hours as needed for Pain for up to 3 days. Intended supply: 3 days. Take lowest dose possible to manage pain 18 tablet 0    ciprofloxacin (CIPRO) 500 MG tablet Take 1 tablet by mouth 2 times daily for 7 days 14 tablet 0    metroNIDAZOLE (FLAGYL) 500 MG tablet Take 1 tablet by mouth 3 times daily for 10 days 30 tablet 0    omeprazole (PRILOSEC) 20 MG delayed release capsule Take 1 capsule by mouth Daily for 20 days 20 capsule 0    ondansetron (ZOFRAN-ODT) 4 MG disintegrating tablet Take 1 tablet by mouth every 8 hours as needed for Nausea or Vomiting 20 tablet 0       Allergies: Allergies   Allergen Reactions    Ibuprofen Anaphylaxis        Social History:   reports that she has been smoking cigarettes. She has a 5.00 pack-year smoking history. She uses smokeless tobacco. She reports current alcohol use. She reports that she does not use drugs. Family History:  family history includes Arthritis in her paternal grandfather; Cancer in her paternal grandmother; Depression in her maternal aunt, mother, and sister; Diabetes in her mother and paternal grandmother; Heart Disease in her paternal grandfather; High Blood Pressure in her maternal grandfather, paternal grandfather, and paternal grandmother; High Cholesterol in her maternal grandfather, paternal grandfather, and paternal grandmother; Mental Illness in her maternal aunt; Stroke in her maternal grandfather, paternal grandfather, and paternal grandmother; Substance Abuse in her brother, maternal uncle, paternal uncle, and sister.      Physical Exam:  BP (!) 145/85   Pulse 82   Temp 98.1 °F (36.7 °C) (Oral)   Resp 12    5' 9\" (1.753 m)   Wt 186 lb (84.4 kg)   SpO2 98%   BMI 27.47 kg/m²     General appearance:  Appears comfortable. Well nourished  Eyes: Sclera clear, pupils equal  ENT: Moist mucus membranes, no thrush. Trachea midline. Cardiovascular: Regular rhythm, normal S1, S2. No murmur, gallop, rub. No edema in lower extremities  Respiratory: Clear to auscultation bilaterally, no wheeze, good inspiratory effort  Gastrointestinal: Abdomen soft, non-tender, not distended, normal bowel sounds  Musculoskeletal: No cyanosis in digits, neck supple  Neurology: Cranial nerves grossly intact. Alert and oriented in time, place and person. No speech or motor deficits  Psychiatry: Appropriate affect. Not agitated  Skin: Warm, dry, normal turgor, no rash    Labs:  CBC:   Lab Results   Component Value Date    WBC 12.0 09/09/2020    RBC 4.45 09/09/2020    HGB 14.3 09/09/2020    HCT 43.1 09/09/2020    MCV 96.8 09/09/2020    MCH 32.1 09/09/2020    MCHC 33.1 09/09/2020    RDW 14.3 09/09/2020     09/09/2020    MPV 9.2 09/09/2020     BMP:    Lab Results   Component Value Date     09/09/2020    K 4.0 09/09/2020     09/09/2020    CO2 22 09/09/2020    BUN 5 09/09/2020    CREATININE 0.8 09/09/2020    CALCIUM 9.5 09/09/2020    GFRAA >60 09/09/2020    GFRAA >60 03/15/2010    LABGLOM >60 09/09/2020    GLUCOSE 106 09/09/2020       Chest Xray:   EKG:    I visualized CXR images and EKG strips      Problem List  Active Problems:    Abdominal pain  Resolved Problems:    * No resolved hospital problems. *        Assessment/Plan:   Abdominal pain  -With abnormal CT finding stating masslike area in the ileocecal valve concern for either focal infection versus malignancy  -Given patient pain is uncontrolled.   Will admit for further evaluation  -We will consult GI for likely colonoscopy  -N.p.o. after midnight    -We will await further recommendation    Liver lesion  -Noted one lesion on the liver.  -May need further work-up differential include hemangioma versus mets  -We will consider MRI examination if warranted      Elza Oleary MD    9/9/2020 2:23 PM

## 2020-09-09 NOTE — ED PROVIDER NOTES
[facility]        Pt Name: Isabella Dave  MRN: [id number]  [place] 1985  Date of evaluation: 2020  Provider: Rosenda Zhao PA-C  PCP: Jordi Jose MD    PAU. I have evaluated this patient. My supervising physician was available for consultation. CHIEF COMPLAINT       Chief Complaint   Patient presents with    Abdominal Pain     pt with c/o abd pain- states was seen here yesterday for same, told if worse come back- states it's worse. HISTORY OF PRESENT ILLNESS   (Location, Timing/Onset, Context/Setting, Quality, Duration, Modifying Factors, Severity, Associated Signs and Symptoms)  Note limiting factors. Isabella Dave is a 30 y.o. female that presents back to the emergency department. She was evaluated here yesterday and had CT imaging that revealed cecal mass with liver lesion that is concerning for possible malignancy or infectious etiology. Was sent home on Cipro, Flagyl, hydrocodone. She just began taking her medication this morning. She has had 2 doses of antibiotics with her most recent one just before presenting to the emergency department. She does not have a family physician. She is not seen a GI doctor since she was 16 and had colonoscopy related to hemorrhoids. States that the pain is been ongoing for the past 5 months but getting worse over the past 1 month and then worse again this weekend and worse again today. She states that it still comes and goes. She took hydrocodone before presenting here and states she is actually feeling better by the time she gets here. Denies dysuria, hematuria, bloody stool. Admits to associated nausea, diarrhea. Denies chest pain, shortness of breath. Denies any aggravating alleviating factors for her symptoms. Has history of 3  sections.     Nursing Notes were all reviewed and agreed with or any disagreements were addressed in the DIAGNOSTIC RESULTS   LABS:    Labs Reviewed   CBC WITH AUTO DIFFERENTIAL - Abnormal; Notable for the following components:       Result Value    WBC 12.0 (*)     Neutrophils Absolute 8.7 (*)     All other components within normal limits    Narrative:     Performed at:  [facility]  [place]   Phone [phone]   COMPREHENSIVE METABOLIC PANEL - Abnormal; Notable for the following components:    Glucose 106 (*)     BUN 5 (*)     All other components within normal limits    Narrative:     Performed at:  [facility]  [place]   Phone [phone]   URINE RT REFLEX TO CULTURE - Abnormal; Notable for the following components:    Clarity, UA TURBID (*)     Blood, Urine MODERATE (*)     Leukocyte Esterase, Urine SMALL (*)     All other components within normal limits    Narrative:     Performed at:  [facility] [address]   Phone [phone]   MICROSCOPIC URINALYSIS - Abnormal; Notable for the following components:    WBC, UA 9 (*)     All other components within normal limits    Narrative:     Performed at:  [facility]  [place]   Phone [phone]   HCG, SERUM, QUALITATIVE    Narrative:     Performed at:  [facility]  [place]   Phone [phone]   LIPASE    Narrative:     Performed at:  [facility] [address]   Phone [phone]       All other labs were within normal range or not returned as of this dictation. EKG: All EKG's are interpreted by the Emergency Department Physician in the absence of a cardiologist.  Please see their note for interpretation of EKG.       RADIOLOGY:   Non-plain film images such as CT, Ultrasound and MRI are read by the radiologist. Celeste Schultz radiographic images are visualized and preliminarily interpreted by the ED Provider with the below findings:        Interpretation per the Radiologist below, if available at the time of this note:    No orders to display     Ct Abdomen Pelvis W Iv Contrast Additional Contrast? None    Result Date: 9/8/2020  EXAMINATION: CT OF THE ABDOMEN AND PELVIS WITH CONTRAST 9/8/2020 2:03 pm TECHNIQUE: CT of the abdomen and pelvis was performed with the administration of intravenous contrast. Multiplanar reformatted images are provided for review. Dose modulation, iterative reconstruction, and/or weight based adjustment of the mA/kV was utilized to reduce the radiation dose to as low as reasonably achievable. COMPARISON: None. HISTORY: ORDERING SYSTEM PROVIDED HISTORY: upper abdominal and suprapubic pain TECHNOLOGIST PROVIDED HISTORY: Reason for exam:->upper abdominal and suprapubic pain Additional Contrast?->None Reason for Exam: upper abdominal and suprapubic pain Acuity: Unknown Type of Exam: Unknown FINDINGS: Lower Chest: No evidence of pneumonia or other acute findings. Organs: There is a low-density lesion in the posterior dome of the liver measuring 1.8 cm. Margins appear ill-defined. Normal appearance of the gallbladder. No evidence of pancreatitis. No ureteral stone or hydronephrosis. No evidence of pyelonephritis. GI/Bowel: At the ileocecal valve there is rounded increased density. This is not well evaluated due to collapse and lack of contrast.  Adjacent stranding of fat appears present, and there appears to be mild wall thickening of the adjacent cecum. Prominent adjacent lymph nodes are present in the right lower quadrant measuring up to 1.4 x 0.8 cm, mildly enlarged. No bowel obstruction. Normal appearance of the appendix. Pelvis: No acute abnormality of the pelvis. No evidence of cystitis. Peritoneum/Retroperitoneum: No free air, ascites or abscess.  Bones/Soft Tissues: No

## 2020-09-09 NOTE — ED NOTES
Pharmacy Medication History Note      List of current medications patient is taking is complete. Source of information: Patient    Changes made to medication list:  Medications flagged for removal (include reason, ex. noncompliance):  none    Medications removed (include reason, ex. therapy complete or physician discontinued):  Lidocaine patch- therapy completed    Medications added/doses adjusted:  none    Other notes (ex. Recent course of antibiotics, Coumadin dosing):  Cipro and Flagyl prescribed 9/8/20 from ED, patient not yet started. Denies use of other OTC or herbal medications. Last dose times updated. Angel Prieto, [place]  ED Pharmacist [id number]  9/9/2020    Current Facility-Administered Medications on File Prior to Encounter   Medication Dose Route Frequency Provider Last Rate Last Dose    [COMPLETED] 0.9 % sodium chloride bolus  1,000 mL Intravenous Once Augusto Connors DO   Stopped at 09/08/20 1521     Current Outpatient Medications on File Prior to Encounter   Medication Sig Dispense Refill    HYDROcodone-acetaminophen (NORCO) 5-325 MG per tablet Take 1 tablet by mouth every 4 hours as needed for Pain for up to 3 days. Intended supply: 3 days. Take lowest dose possible to manage pain 18 tablet 0    ciprofloxacin (CIPRO) 500 MG tablet Take 1 tablet by mouth 2 times daily for 7 days 14 tablet 0    metroNIDAZOLE (FLAGYL) 500 MG tablet Take 1 tablet by mouth 3 times daily for 10 days 30 tablet 0    omeprazole (PRILOSEC) 20 MG delayed release capsule Take 1 capsule by mouth Daily for 20 days 20 capsule 0    ondansetron (ZOFRAN-ODT) 4 MG disintegrating tablet Take 1 tablet by mouth every 8 hours as needed for Nausea or Vomiting 20 tablet 0    [DISCONTINUED] Multiple Vitamin (MULTI-VITAMIN DAILY PO) Take by mouth      [DISCONTINUED] lidocaine (LIDODERM) 5 % Place 1 patch onto the skin daily 12 hours on, 12 hours off.  30 patch 0    [DISCONTINUED] acetaminophen (APAP EXTRA STRENGTH) 500 MG tablet Take 1 tablet by mouth every 6 hours as needed for Pain 30 tablet 0

## 2020-09-10 ENCOUNTER — ANESTHESIA EVENT (OUTPATIENT)
Dept: OPERATING ROOM | Age: 35
DRG: 330 | End: 2020-09-10

## 2020-09-10 ENCOUNTER — ANESTHESIA EVENT (OUTPATIENT)
Dept: ENDOSCOPY | Age: 35
DRG: 330 | End: 2020-09-10

## 2020-09-10 ENCOUNTER — ANESTHESIA (OUTPATIENT)
Dept: ENDOSCOPY | Age: 35
DRG: 330 | End: 2020-09-10

## 2020-09-10 VITALS
RESPIRATION RATE: 14 BRPM | DIASTOLIC BLOOD PRESSURE: 47 MMHG | OXYGEN SATURATION: 99 % | SYSTOLIC BLOOD PRESSURE: 96 MMHG

## 2020-09-10 PROBLEM — K63.89 CECUM MASS: Status: ACTIVE | Noted: 2020-09-10

## 2020-09-10 PROBLEM — R19.00 ABDOMINAL MASS: Status: ACTIVE | Noted: 2020-09-10

## 2020-09-10 LAB
ANION GAP SERPL CALCULATED.3IONS-SCNC: 10 MMOL/L (ref 3–16)
BUN BLDV-MCNC: 5 MG/DL (ref 7–20)
CALCIUM SERPL-MCNC: 9.3 MG/DL (ref 8.3–10.6)
CEA: 3.6 NG/ML (ref 0–5)
CHLORIDE BLD-SCNC: 109 MMOL/L (ref 99–110)
CO2: 23 MMOL/L (ref 21–32)
CREAT SERPL-MCNC: 1 MG/DL (ref 0.6–1.1)
GFR AFRICAN AMERICAN: >60
GFR NON-AFRICAN AMERICAN: >60
GLUCOSE BLD-MCNC: 88 MG/DL (ref 70–99)
HCT VFR BLD CALC: 41.8 % (ref 36–48)
HEMOGLOBIN: 14.1 G/DL (ref 12–16)
MCH RBC QN AUTO: 32.9 PG (ref 26–34)
MCHC RBC AUTO-ENTMCNC: 33.8 G/DL (ref 31–36)
MCV RBC AUTO: 97.2 FL (ref 80–100)
PDW BLD-RTO: 14.2 % (ref 12.4–15.4)
PLATELET # BLD: 273 K/UL (ref 135–450)
PMV BLD AUTO: 8.8 FL (ref 5–10.5)
POTASSIUM REFLEX MAGNESIUM: 4.4 MMOL/L (ref 3.5–5.1)
RBC # BLD: 4.3 M/UL (ref 4–5.2)
SODIUM BLD-SCNC: 142 MMOL/L (ref 136–145)
WBC # BLD: 9.5 K/UL (ref 4–11)

## 2020-09-10 PROCEDURE — APPNB30 APP NON BILLABLE TIME 0-30 MINS: Performed by: NURSE PRACTITIONER

## 2020-09-10 PROCEDURE — 6360000002 HC RX W HCPCS: Performed by: HOSPITALIST

## 2020-09-10 PROCEDURE — 3609010600 HC COLONOSCOPY POLYPECTOMY SNARE/COLD BIOPSY: Performed by: INTERNAL MEDICINE

## 2020-09-10 PROCEDURE — 85027 COMPLETE CBC AUTOMATED: CPT

## 2020-09-10 PROCEDURE — 6360000002 HC RX W HCPCS: Performed by: INTERNAL MEDICINE

## 2020-09-10 PROCEDURE — 88305 TISSUE EXAM BY PATHOLOGIST: CPT

## 2020-09-10 PROCEDURE — 2580000003 HC RX 258: Performed by: INTERNAL MEDICINE

## 2020-09-10 PROCEDURE — 2500000003 HC RX 250 WO HCPCS: Performed by: INTERNAL MEDICINE

## 2020-09-10 PROCEDURE — 1200000000 HC SEMI PRIVATE

## 2020-09-10 PROCEDURE — 7100000001 HC PACU RECOVERY - ADDTL 15 MIN: Performed by: INTERNAL MEDICINE

## 2020-09-10 PROCEDURE — APPSS60 APP SPLIT SHARED TIME 46-60 MINUTES: Performed by: NURSE PRACTITIONER

## 2020-09-10 PROCEDURE — 2709999900 HC NON-CHARGEABLE SUPPLY: Performed by: INTERNAL MEDICINE

## 2020-09-10 PROCEDURE — 0DJD8ZZ INSPECTION OF LOWER INTESTINAL TRACT, VIA NATURAL OR ARTIFICIAL OPENING ENDOSCOPIC: ICD-10-PCS | Performed by: INTERNAL MEDICINE

## 2020-09-10 PROCEDURE — 2500000003 HC RX 250 WO HCPCS: Performed by: NURSE ANESTHETIST, CERTIFIED REGISTERED

## 2020-09-10 PROCEDURE — 80048 BASIC METABOLIC PNL TOTAL CA: CPT

## 2020-09-10 PROCEDURE — 3700000001 HC ADD 15 MINUTES (ANESTHESIA): Performed by: INTERNAL MEDICINE

## 2020-09-10 PROCEDURE — 6360000002 HC RX W HCPCS: Performed by: PHYSICIAN ASSISTANT

## 2020-09-10 PROCEDURE — 36415 COLL VENOUS BLD VENIPUNCTURE: CPT

## 2020-09-10 PROCEDURE — 6370000000 HC RX 637 (ALT 250 FOR IP): Performed by: INTERNAL MEDICINE

## 2020-09-10 PROCEDURE — 82378 CARCINOEMBRYONIC ANTIGEN: CPT

## 2020-09-10 PROCEDURE — 7100000000 HC PACU RECOVERY - FIRST 15 MIN: Performed by: INTERNAL MEDICINE

## 2020-09-10 PROCEDURE — 94760 N-INVAS EAR/PLS OXIMETRY 1: CPT

## 2020-09-10 PROCEDURE — 2580000003 HC RX 258: Performed by: NURSE ANESTHETIST, CERTIFIED REGISTERED

## 2020-09-10 PROCEDURE — 96376 TX/PRO/DX INJ SAME DRUG ADON: CPT

## 2020-09-10 PROCEDURE — 3700000000 HC ANESTHESIA ATTENDED CARE: Performed by: INTERNAL MEDICINE

## 2020-09-10 PROCEDURE — 99253 IP/OBS CNSLTJ NEW/EST LOW 45: CPT | Performed by: SURGERY

## 2020-09-10 PROCEDURE — 3609010300 HC COLONOSCOPY W/BIOPSY SINGLE/MULTIPLE: Performed by: INTERNAL MEDICINE

## 2020-09-10 PROCEDURE — 6360000002 HC RX W HCPCS: Performed by: NURSE ANESTHETIST, CERTIFIED REGISTERED

## 2020-09-10 RX ORDER — GLYCOPYRROLATE 0.2 MG/ML
INJECTION INTRAMUSCULAR; INTRAVENOUS PRN
Status: DISCONTINUED | OUTPATIENT
Start: 2020-09-10 | End: 2020-09-10 | Stop reason: SDUPTHER

## 2020-09-10 RX ORDER — ONDANSETRON 2 MG/ML
INJECTION INTRAMUSCULAR; INTRAVENOUS PRN
Status: DISCONTINUED | OUTPATIENT
Start: 2020-09-10 | End: 2020-09-10 | Stop reason: SDUPTHER

## 2020-09-10 RX ORDER — MORPHINE SULFATE 4 MG/ML
4 INJECTION, SOLUTION INTRAMUSCULAR; INTRAVENOUS
Status: DISCONTINUED | OUTPATIENT
Start: 2020-09-10 | End: 2020-09-11

## 2020-09-10 RX ORDER — PROPOFOL 10 MG/ML
INJECTION, EMULSION INTRAVENOUS CONTINUOUS PRN
Status: DISCONTINUED | OUTPATIENT
Start: 2020-09-10 | End: 2020-09-10 | Stop reason: SDUPTHER

## 2020-09-10 RX ORDER — PROPOFOL 10 MG/ML
INJECTION, EMULSION INTRAVENOUS PRN
Status: DISCONTINUED | OUTPATIENT
Start: 2020-09-10 | End: 2020-09-10 | Stop reason: SDUPTHER

## 2020-09-10 RX ORDER — CIPROFLOXACIN 2 MG/ML
400 INJECTION, SOLUTION INTRAVENOUS EVERY 12 HOURS
Status: DISCONTINUED | OUTPATIENT
Start: 2020-09-10 | End: 2020-09-11

## 2020-09-10 RX ORDER — LIDOCAINE HYDROCHLORIDE 20 MG/ML
INJECTION, SOLUTION INFILTRATION; PERINEURAL PRN
Status: DISCONTINUED | OUTPATIENT
Start: 2020-09-10 | End: 2020-09-10 | Stop reason: SDUPTHER

## 2020-09-10 RX ORDER — SODIUM CHLORIDE 9 MG/ML
INJECTION, SOLUTION INTRAVENOUS CONTINUOUS PRN
Status: DISCONTINUED | OUTPATIENT
Start: 2020-09-10 | End: 2020-09-10 | Stop reason: SDUPTHER

## 2020-09-10 RX ADMIN — PROPOFOL 10 MG: 10 INJECTION, EMULSION INTRAVENOUS at 09:49

## 2020-09-10 RX ADMIN — ONDANSETRON 4 MG: 2 INJECTION INTRAMUSCULAR; INTRAVENOUS at 21:49

## 2020-09-10 RX ADMIN — PROPOFOL 100 MG: 10 INJECTION, EMULSION INTRAVENOUS at 09:41

## 2020-09-10 RX ADMIN — LIDOCAINE HYDROCHLORIDE 100 MG: 20 INJECTION, SOLUTION INFILTRATION; PERINEURAL at 09:41

## 2020-09-10 RX ADMIN — ONDANSETRON 4 MG: 2 INJECTION INTRAMUSCULAR; INTRAVENOUS at 15:48

## 2020-09-10 RX ADMIN — GLYCOPYRROLATE 0.2 MG: 0.2 INJECTION, SOLUTION INTRAMUSCULAR; INTRAVENOUS at 09:41

## 2020-09-10 RX ADMIN — ONDANSETRON 4 MG: 2 INJECTION INTRAMUSCULAR; INTRAVENOUS at 10:56

## 2020-09-10 RX ADMIN — ONDANSETRON 4 MG: 2 INJECTION INTRAMUSCULAR; INTRAVENOUS at 10:01

## 2020-09-10 RX ADMIN — Medication 10 ML: at 20:33

## 2020-09-10 RX ADMIN — MORPHINE SULFATE 4 MG: 4 INJECTION INTRAVENOUS at 20:34

## 2020-09-10 RX ADMIN — PROPOFOL 50 MG: 10 INJECTION, EMULSION INTRAVENOUS at 09:45

## 2020-09-10 RX ADMIN — MORPHINE SULFATE 4 MG: 4 INJECTION INTRAVENOUS at 15:48

## 2020-09-10 RX ADMIN — PROPOFOL 140 MCG/KG/MIN: 10 INJECTION, EMULSION INTRAVENOUS at 09:41

## 2020-09-10 RX ADMIN — MORPHINE SULFATE 4 MG: 4 INJECTION INTRAVENOUS at 10:55

## 2020-09-10 RX ADMIN — METRONIDAZOLE 500 MG: 250 TABLET ORAL at 13:41

## 2020-09-10 RX ADMIN — SODIUM CHLORIDE: 9 INJECTION, SOLUTION INTRAVENOUS at 09:13

## 2020-09-10 RX ADMIN — MORPHINE SULFATE 4 MG: 4 INJECTION INTRAVENOUS at 01:16

## 2020-09-10 RX ADMIN — CIPROFLOXACIN 400 MG: 2 INJECTION, SOLUTION INTRAVENOUS at 21:49

## 2020-09-10 RX ADMIN — METRONIDAZOLE 500 MG: 500 INJECTION, SOLUTION INTRAVENOUS at 20:34

## 2020-09-10 ASSESSMENT — PULMONARY FUNCTION TESTS
PIF_VALUE: 0

## 2020-09-10 ASSESSMENT — PAIN DESCRIPTION - FREQUENCY: FREQUENCY: CONTINUOUS

## 2020-09-10 ASSESSMENT — PAIN SCALES - GENERAL
PAINLEVEL_OUTOF10: 3
PAINLEVEL_OUTOF10: 3
PAINLEVEL_OUTOF10: 9
PAINLEVEL_OUTOF10: 8
PAINLEVEL_OUTOF10: 10

## 2020-09-10 ASSESSMENT — PAIN DESCRIPTION - LOCATION
LOCATION: ABDOMEN

## 2020-09-10 ASSESSMENT — PAIN DESCRIPTION - PAIN TYPE
TYPE: ACUTE PAIN

## 2020-09-10 ASSESSMENT — PAIN DESCRIPTION - ONSET: ONSET: ON-GOING

## 2020-09-10 ASSESSMENT — PAIN - FUNCTIONAL ASSESSMENT: PAIN_FUNCTIONAL_ASSESSMENT: ACTIVITIES ARE NOT PREVENTED

## 2020-09-10 ASSESSMENT — PAIN DESCRIPTION - DESCRIPTORS: DESCRIPTORS: DISCOMFORT;CRAMPING

## 2020-09-10 NOTE — PROGRESS NOTES
Dr. Laney Chase at bedside talking to patient.     Electronically signed by Melina Guillermo RN on 9/10/2020 at 10:27 AM

## 2020-09-10 NOTE — PROGRESS NOTES
This RN transferred patient to room.  Bedside handoff with Margie Wood    Electronically signed by John Gauthier RN on 9/10/2020 at 10:45 AM

## 2020-09-10 NOTE — PROGRESS NOTES
Pt has c-diff stool sample order, scheduled for bowel prep, spoke with day shift nurse, pt unable to have BM in the last 2 hours, will start bowel prep without collecting stool sample.

## 2020-09-10 NOTE — PROGRESS NOTES
[facility] PROGRESS NOTE    9/10/2020 12:21 PM        Name: Erna Torres . Admitted: 9/9/2020  Primary Care Provider: Nic Cooper MD (Tel: [phone])      Subjective:  Patient is a 31 yo female with hx anxiety/depression. She presented to ER with c/o abdominal pain which has been off and on for the past 5 months but worse over the past few days. Associated with n/v/d. Evaluated in ER on Tuesday. CT scan showed questionable mass vs inflammation and she was discharged home on Cipro and Flagyl. The pain worsened prompting return to hospital.     Presently resting in bed. Patient underwent colonoscopy thi [facility] and found to have friable ulcerated mass involving the ICV and deeper into the cecum. Surgery consult pending. Continues to note intermittent abdominal discomfort, \"comes in waves. \" Accompanied with mild nausea, no further nausea or diarrhea.      Reviewed interval ancillary notes    Current Medications  morphine injection 4 mg, Q3H PRN  ondansetron (ZOFRAN) injection 4 mg, Q30 Min PRN  ciprofloxacin (CIPRO) tablet 500 mg, BID  HYDROcodone-acetaminophen (NORCO) 5-325 MG per tablet 1 tablet, Q4H PRN  metroNIDAZOLE (FLAGYL) tablet 500 mg, TID  pantoprazole (PROTONIX) tablet 40 mg, QAM AC  sodium chloride flush 0.9 % injection 10 mL, 2 times per day  sodium chloride flush 0.9 % injection 10 mL, PRN  acetaminophen (TYLENOL) tablet 650 mg, Q6H PRN    Or  acetaminophen (TYLENOL) suppository 650 mg, Q6H PRN  polyethylene glycol (GLYCOLAX) packet 17 g, Daily PRN  promethazine (PHENERGAN) tablet 12.5 mg, Q6H PRN    Or  ondansetron (ZOFRAN) injection 4 mg, Q6H PRN  enoxaparin (LOVENOX) injection 40 mg, Nightly        Objective:  /82   Pulse 53   Temp 97.9 °F (36.6 °C) (Oral)   Resp 16   Ht 5' 9\" (1.753 m)   Wt 186 lb (84.4 kg)   SpO2 99%   BMI 27.47 kg/m²     Intake/Output Summary (Last 24 hours) at to be mild wall thickening of the    adjacent cecum.  Prominent adjacent lymph nodes are present in the right    lower quadrant measuring up to 1.4 x 0.8 cm, mildly enlarged.  No bowel    obstruction.  Normal appearance of the appendix.         Pelvis: No acute abnormality of the pelvis. No evidence of cystitis.         Peritoneum/Retroperitoneum: No free air, ascites or abscess.         Bones/Soft Tissues: No fracture or other acute osseous process.              Impression    Masslike area at the ileocecal valve and adjacent cecum.  This may either be    due to malignancy or focal infection.  Adjacent metastatic versus    reactive/inflammatory mildly enlarged lymph nodes.  Colonoscopy recommended.         Indeterminate low-density lesion at the dome of the liver which may be a    metastatic focus. Alveda Borer also in the differential diagnosis.  If further    imaging evaluation is needed a pre and post contrast liver mass protocol MRI    examination would be recommended.             Colonoscopy 9/10/2020:  IMPRESSION :  Friable ulcerated mass involving the ICV and deeper into  the cecum. Separate from the append orifice. Unable to  intubate ileum. PLAN :   Await path - rush put on path  surgical consult  CEA  clears until seen by surgery      Problem List  Active Problems:    Abdominal pain    Abdominal mass  Resolved Problems:    * No resolved hospital problems. *       Assessment & Plan:   1. Abdominal pain. With abnormal CT finding with concern for mass vs inflammation. Continue ciprofloxacin and metronidazole. Pain adequately controlled with prn pain meds. 2. Abnormal CT Scan showing mass like area at ICV and cecum. Colonoscopy this am revealed friable ulcerated mass involving the ICV and deeper into the cecum. Pathology pending, surgical consult pending. 3. Liver lesion. CT scan noted lesion in dome of liver concerning for hemangioma vs mets. May need MRI to further characterize.        Diet: DIET CLEAR

## 2020-09-10 NOTE — ANESTHESIA POSTPROCEDURE EVALUATION
Department of Anesthesiology  Postprocedure Note    Patient: Chel Martínez  MRN: [id number]  YOB: 1985  Date of evaluation: 9/10/2020  Time:  2:04 PM     Procedure Summary     Date:  09/10/20 Room / Location:  [address] [place] [address]    Anesthesia Start:  7036 Anesthesia Stop:  1016    Procedures:       COLONOSCOPY WITH BIOPSY ILEO-CECAL VALVE (N/A )      COLONOSCOPY POLYPECTOMY SNARE/COLD OF SIGMOID COLON POLYP Diagnosis:  (Abdominal pain/Abnormal CT)    Surgeon:  Liz Marmolejo MD Responsible Provider:  Gina Abdullahi MD    Anesthesia Type:  MAC ASA Status:  2          Anesthesia Type: MAC    Lindy Phase I: Lindy Score: 10    Lindy Phase II:      Last vitals: Reviewed and per EMR flowsheets.        Anesthesia Post Evaluation    Patient location during evaluation: PACU  Complications: no  Cardiovascular status: hemodynamically stable  Respiratory status: acceptable

## 2020-09-10 NOTE — ANESTHESIA PRE PROCEDURE
Department of Anesthesiology  Preprocedure Note       Name:  Alfredo Zheng   Age:  30 y.o.  :  1985                                          MRN:  [id number]         Date:  9/10/2020      Surgeon: Moreno Zafar):  Aba Gaytan MD    Procedure: Procedure(s):  COLONOSCOPY DIAGNOSTIC    Medications prior to admission:   Prior to Admission medications    Medication Sig Start Date End Date Taking? Authorizing Provider   HYDROcodone-acetaminophen (NORCO) 5-325 MG per tablet Take 1 tablet by mouth every 4 hours as needed for Pain for up to 3 days. Intended supply: 3 days.  Take lowest dose possible to manage pain 20 Yes Antione Connors, DO   ciprofloxacin (CIPRO) 500 MG tablet Take 1 tablet by mouth 2 times daily for 7 days 9/8/20 9/15/20 Yes Martin Connors, DO   metroNIDAZOLE (FLAGYL) 500 MG tablet Take 1 tablet by mouth 3 times daily for 10 days 20 Yes Antione Connors, DO   omeprazole (PRILOSEC) 20 MG delayed release capsule Take 1 capsule by mouth Daily for 20 days 20 Yes Martin Connors,    ondansetron (ZOFRAN-ODT) 4 MG disintegrating tablet Take 1 tablet by mouth every 8 hours as needed for Nausea or Vomiting 20  Yes Jenna Connors DO       Current medications:    Current Facility-Administered Medications   Medication Dose Route Frequency Provider Last Rate Last Dose    morphine injection 4 mg  4 mg Intravenous Q3H PRN Rowdy Morrison PA-C   4 mg at 09/10/20 0116    ondansetron (ZOFRAN) injection 4 mg  4 mg Intravenous Q30 Min PRN Linda Tuttle PA-C   4 mg at 20 1455    ciprofloxacin (CIPRO) tablet 500 mg  500 mg Oral BID Nika Castrejon MD   500 mg at 20    HYDROcodone-acetaminophen (NORCO) 5-325 MG per tablet 1 tablet  1 tablet Oral Q4H PRN Nika Castrejon MD        metroNIDAZOLE (FLAGYL) tablet 500 mg  500 mg Oral TID Palmira Rogel MD   500 mg at 20 225    pantoprazole (PROTONIX) tablet 40 mg  40 mg Oral QAM AC [place], MD  sodium chloride flush 0.9 % injection 10 mL  10 mL Intravenous 2 times per day Sheryl Gray MD   10 mL at 20    sodium chloride flush 0.9 % injection 10 mL  10 mL Intravenous PRN Nika Castrejon MD        acetaminophen (TYLENOL) tablet 650 mg  650 mg Oral Q6H PRN Nika Castrejon MD        Or   [facility] acetaminophen (TYLENOL) suppository 650 mg  650 mg Rectal Q6H PRN Nika Castrejon MD        polyethylene glycol (GLYCOLAX) packet 17 g  17 g Oral Daily PRN Nika Castrejon MD        promethazine (PHENERGAN) tablet 12.5 mg  12.5 mg Oral Q6H PRN Nika Castrejon MD        Or    ondansetron (ZOFRAN) injection 4 mg  4 mg Intravenous Q6H PRN Sheryl Gray MD   4 mg at 20    enoxaparin (LOVENOX) injection 40 mg  40 mg Subcutaneous Nightly Nika Castrejon MD   40 mg at 20       Allergies:     Allergies   Allergen Reactions    Ibuprofen Anaphylaxis     \"kidney failure at age 31\"       Problem List:    Patient Active Problem List   Diagnosis Code    Abdominal pain R10.9       Past Medical History:        Diagnosis Date    Anxiety     Chronic back pain greater than 3 months duration     Depression        Past Surgical History:        Procedure Laterality Date     SECTION         Social History:    Social History     Tobacco Use    Smoking status: Current Some Day Smoker     Packs/day: 0.50     Years: 10.00     Pack years: 5.00     Types: Cigarettes     Last attempt to quit: 3/15/2010     Years since quitting: 10.4    Smokeless tobacco: Current User   Substance Use Topics    Alcohol use: Yes     Comment: 12 pack on weekends                                Ready to quit: Not Answered  Counseling given: Not Answered      Vital Signs (Current):   Vitals:    20 1700 20 2245 09/10/20 0811 09/10/20 0836   BP: 118/77 136/86 (!) 148/80 119/63   Pulse: 69 58 56 53   Resp: 16 14 16 16   Temp: 98.3 °F (36.8 °C) 98.4 °F (36.9 °C) 98 °F (36.7 °C) 98.6 °F (37 °C)   TempSrc: Oral Oral Oral Temporal   SpO2: 98% 98% 98% 100%   Weight:       Height:                                                  BP Readings from Last 3 Encounters:   09/10/20 119/63   09/08/20 (!) 149/78   01/06/20 128/69       NPO Status:                                                                                 BMI:   Wt Readings from Last 3 Encounters:   09/09/20 186 lb (84.4 kg)   09/08/20 186 lb (84.4 kg)   01/06/20 195 lb 1 oz (88.5 kg)     Body mass index is 27.47 kg/m². CBC:   Lab Results   Component Value Date    WBC 9.5 09/10/2020    RBC 4.30 09/10/2020    HGB 14.1 09/10/2020    HCT 41.8 09/10/2020    MCV 97.2 09/10/2020    RDW 14.2 09/10/2020     09/10/2020       CMP:   Lab Results   Component Value Date     09/10/2020    K 4.4 09/10/2020     09/10/2020    CO2 23 09/10/2020    BUN 5 09/10/2020    CREATININE 1.0 09/10/2020    GFRAA >60 09/10/2020    GFRAA >60 03/15/2010    AGRATIO 1.3 09/09/2020    LABGLOM >60 09/10/2020    GLUCOSE 88 09/10/2020    PROT 7.7 09/09/2020    PROT 6.6 02/03/2010    CALCIUM 9.3 09/10/2020    BILITOT 0.5 09/09/2020    ALKPHOS 72 09/09/2020    AST 20 09/09/2020    ALT 16 09/09/2020       POC Tests: No results for input(s): POCGLU, POCNA, POCK, POCCL, POCBUN, POCHEMO, POCHCT in the last 72 hours.     Coags: No results found for: PROTIME, INR, APTT    HCG (If Applicable):   Lab Results   Component Value Date    PREGTESTUR Negative 04/07/2019        ABGs: No results found for: PHART, PO2ART, QPK2PPW, XAJ7KWR, BEART, N9WLVADW     Type & Screen (If Applicable):  Lab Results   Component Value Date    LABABO B 06/10/2010    [address] Positive 06/10/2010       Drug/Infectious Status (If Applicable):  No results found for: HIV, HEPCAB    COVID-19 Screening (If Applicable): No results found for: COVID19      Anesthesia Evaluation  Patient summary reviewed and Nursing notes reviewed  Airway: Mallampati: II        Dental: normal exam         Pulmonary:normal exam Cardiovascular:  Exercise tolerance: good (>4 METS),   (+) hyperlipidemia      ECG reviewed  Rhythm: regular                      Neuro/Psych:   (+) neuromuscular disease:, psychiatric history:depression/anxiety             GI/Hepatic/Renal: Neg GI/Hepatic/Renal ROS            Endo/Other: Negative Endo/Other ROS                    Abdominal:           Vascular:                                        Anesthesia Plan      MAC     ASA 2       Induction: intravenous. Anesthetic plan and risks discussed with patient. Plan discussed with CRNA.                   Anastasia Smart MD   9/10/2020

## 2020-09-10 NOTE — PROGRESS NOTES
Patient arrived from endo to pacu. On 3L n/c. NSR on the monitor.  VSS    Electronically signed by Fredis Meléndez RN on 9/10/2020 at 1011

## 2020-09-10 NOTE — CONSULTS
Gastroenterology Consult Note      Patient: Earl Emerson  : 1985  Acct#:      Date:  9/10/2020    Subjective:       History of Present Illness  Patient is a 30 y.o.  female admitted with Abdominal pain [R10.9]  Abdominal pain [R10.9] who is seen in consult for the same. She began having vague abdominal discomfort, intermittent nausea, vomiting, and diarrhea 5 months ago. Over the past month she has had intermittent pain described as labor pain that starts in the epigastrium and radiates to the umbilicus, bilat sides, and into the back. Nothing seems to bring the pain on. She has had nonbloody diarrhea 5 times daily for 2 weeks. Came to the ED 2 days ago. CT showed masslike area at the IC valve and adjacent cecum felt to be focal infection vs mass as well as liver lesion. She was prescribed antibiotics and outpatient f/u. Pain returned once she was home so she came back to the ED. Pain currently resolved. She took a bowel prep overnight. No weight loss. She had a colonoscopy at age 16 for bleeding which pt states was due to hemorrhoids. No family h/o IBD or colon ca. Pt states a lot of family members have GI issues. No NSAIDs as she had prior NURY from ibuprofen. Past Medical History:   Diagnosis Date    Anxiety     Chronic back pain greater than 3 months duration     Depression       Past Surgical History:   Procedure Laterality Date     SECTION        Past Endoscopic History: see hpi     Admission Meds  No current facility-administered medications on file prior to encounter. Current Outpatient Medications on File Prior to Encounter   Medication Sig Dispense Refill    HYDROcodone-acetaminophen (NORCO) 5-325 MG per tablet Take 1 tablet by mouth every 4 hours as needed for Pain for up to 3 days. Intended supply: 3 days.  Take lowest dose possible to manage pain 18 tablet 0    ciprofloxacin (CIPRO) 500 MG tablet Take 1 tablet by mouth 2 times frequency   Integument/breast: negative for pruritus and rash   Hematologic/lymphatic: negative for bleeding and easy bruising   Musculoskeletal:negative for arthralgias and myalgias   Neurological: negative for dizziness and weakness         Physical Exam  Blood pressure 136/86, pulse 58, temperature 98.4 °F (36.9 °C), temperature source Oral, resp. rate 14, height 5' 9\" (1.753 m), weight 186 lb (84.4 kg), SpO2 98 %, unknown if currently breastfeeding. General appearance: alert, cooperative, no distress, appears stated age  Eyes: Anicteric  Head: Normocephalic, without obvious abnormality  Lungs: clear to auscultation bilaterally, Normal Effort  Heart: regular rate and rhythm, normal S1 and S2, no murmurs or rubs  Abdomen: soft, non-tender. Bowel sounds normal. No masses,  no organomegaly. Extremities: atraumatic, no cyanosis or edema  Skin: warm and dry, no jaundice  Neuro: Grossly intact, A&OX3  Musculoskeletal: 5/5  strength BUE      Data Review:    Recent Labs     09/08/20  1256 09/09/20  1202 09/10/20  0642   WBC 9.3 12.0* 9.5   HGB 15.1 14.3 14.1   HCT 44.5 43.1 41.8   MCV 95.6 96.8 97.2    312 273     Recent Labs     09/08/20  1256 09/09/20  1202 09/10/20  0642    137 142   K 4.3 4.0 4.4    104 109   CO2 20* 22 23   BUN 7 5* 5*   CREATININE 0.8 0.8 1.0     Recent Labs     09/08/20  1256 09/09/20  1202   AST 17 20   ALT 17 16   BILITOT 0.5 0.5   ALKPHOS 76 72     Recent Labs     09/08/20  1256 09/09/20  1202   LIPASE 22.0 25.0     No results for input(s): PROTIME, INR in the last 72 hours. No results for input(s): PTT in the last 72 hours. No results for input(s): OCCULTBLD in the last 72 hours.     Imaging Studies:                 CT-scan of abdomen and pelvis w IV contrast 9/8/20  Impression    Masslike area at the ileocecal valve and adjacent cecum.  This may either be    due to malignancy or focal infection.  Adjacent metastatic versus    reactive/inflammatory mildly enlarged lymph nodes.  Colonoscopy recommended.         Indeterminate low-density lesion at the dome of the liver which may be a    metastatic focus. Cielo Borne also in the differential diagnosis.  If further    imaging evaluation is needed a pre and post contrast liver mass protocol MRI    examination would be recommended. Assessment:     Active Problems:    Abdominal pain  Resolved Problems:    * No resolved hospital problems. *    Abdominal pain, N/V/D, abnormal CT of the colon - CT with masslike area at the ICV and cecum concerning for inflammation vs mass. No anemia. WBC 12 at admission and normalized. ddx includes IBD, malignancy. Liver lesion - indeterminate on CT. Recommendations:   - NPO  - antibiotics  - colonoscopy today  - If colonoscopy negative for malignancy, will need MRI liver to characterize liver lesion. Discussed with Dr. Mary Velazquez, [address]    I have personally performed a face to face diagnostic evaluation on this patient. I have interviewed and examined the patient and I agree with the findings and recommended plan of care. In summary, my findings and plan are the following: pt with 5 mo h/o abd pain and diarrhea. CT concerning for mass but given age and presentation, IBD possible as well.  Colon today      Nubia Majano MD  [id number]

## 2020-09-10 NOTE — OP NOTE
Operative Note    Colon      IMPRESSION :  Friable ulcerated mass involving the ICV and deeper into  the cecum. Separate from the append orifice. Unable to  intubate ileum.   PLAN :   Await path - rush put on path  surgical consult  CEA  clears until seen by surgery    Consuelo Lechuga MD

## 2020-09-10 NOTE — PROGRESS NOTES
PM assessment complete, VSS, evening meds given, bowel prep x2 finished, indep in room. NPO at midnight for colonoscopy, consent signed on chart. The care plan and education has been reviewed and mutually agreed upon with the patient, call light within reach, will continue to monitor.

## 2020-09-10 NOTE — PLAN OF CARE
Pain level will decrease  Outcome: Ongoing     Problem: Anxiety:  Goal: Level of anxiety will decrease  Description: Level of anxiety will decrease  Outcome: Ongoing

## 2020-09-10 NOTE — PLAN OF CARE
[place] [id number] and Laparoscopic Surgery        Assessment & Plan of Care    History of Present Illness: Ms. Franky Anderson is a 30 y.o. female who presented to the ED initially 2 days ago with a 1 month history of severe, intermittent cramping, \"labor\"-like pain in the epigastric region that moved to just superior to the umbilicus and radiates along the right side and back after analgesic administration. No aggravating or alleviating factors noted. Associated nausea, vomiting, chills, and diarrhea for the last 4-5 days; for the last 5 months she has been experiencing gas pain, bloating, indigestion, burping, and anorexia. She denies fever, hematemesis, hematochezia, melena, weight loss/gain, urinary symptoms, chest pain, cough, SOB, or similar symptoms in the past.  She denies vaginal discharge but reports intermittent spotting at baseline, last visit with her gynecologist was about 2 years ago, at which time she reports a normal pelvic exam.   No family history of colon cancer. Medical history includes chronic back pain, depression, and anxiety. Only abdominal surgeries include 3  sections. Current smoker. No blood thinners. CT imaging on initial presentation demonstrated a masslike area at the ileocecal valve and adjacent cecum thought to be malignancy versus focal infection with adjacent metastatic versus reactive/inflammatory mildly enlarged lymph nodes as well as an indeterminate low-density lesion at the dome of the liver. She was discharge home with PO antibiotics and outpatient GI follow up but returned yesterday due to pain. She underwent a colonoscopy today which revealed a friable ulcerated mass involving the ICV and deeper into the cecum, separate from the appendiceal orifice; the ileum was unable to be intubated, biopsies were obtained--pathology and CEA pending.     Physical Exam:  CONSTITUTIONAL:  alert, no apparent distress and mildly overweight   NEUROLOGIC:  Mental Status Exam:  Level of Alertness:   awake  Orientation:   person, place, time  EYES:  sclera clear  ENT:  normocepalic, without obvious abnormality  NECK:  supple, symmetrical, trachea midline  LUNGS:  clear to auscultation  CARDIOVASCULAR:  regular rate and rhythm and no murmur noted  ABDOMEN: soft, mildly distended, only mild tenderness noted in the epigastric region, in the right upper quadrant and in the right lower quadrant, voluntary guarding absent, no masses palpated, normal bowel sounds,  scars noted--transverse lower abdomen, and hernia absent  Extremities: no edema  SKIN:  no bruising or bleeding and normal skin color, texture, turgor    Assessment:  Cecal mass, pathology pending  Liver lesion    Plan:  1. Laparoscopic right hemicolectomy tomorrow with Dr. Heladio Briggs  2. Clear liquids diet as tolerated today; NPO at midnight  3. IV hydration; monitor and correct electrolytes  4. Antibiotics  5. Activity as tolerated  6. PRN analgesics and antiemetics--minimizing narcotics as tolerated  7. DVT prophylaxis with Lovenox and SCD's  8. Management of medical comorbid etiologies per primary team and consulting services    EDUCATION:  Educated patient on plan of care and disease process--all questions answered. Plans discussed with patient and nursing. Reviewed and discussed with Dr. Kyler Fraser consult to follow.       Signed:  VIV Bowles CNP  9/10/2020 11:01 AM

## 2020-09-10 NOTE — PROGRESS NOTES
AM assessment complete. Neuro checks WDL. VSS. Patient is resting in bed with call light in reach. Patient is independent.

## 2020-09-11 ENCOUNTER — ANESTHESIA (OUTPATIENT)
Dept: OPERATING ROOM | Age: 35
DRG: 330 | End: 2020-09-11

## 2020-09-11 VITALS
OXYGEN SATURATION: 100 % | DIASTOLIC BLOOD PRESSURE: 80 MMHG | RESPIRATION RATE: 11 BRPM | SYSTOLIC BLOOD PRESSURE: 141 MMHG | TEMPERATURE: 96.8 F

## 2020-09-11 LAB — HCG QUALITATIVE: NEGATIVE

## 2020-09-11 PROCEDURE — 44205 LAP COLECTOMY PART W/ILEUM: CPT | Performed by: SURGERY

## 2020-09-11 PROCEDURE — 6360000002 HC RX W HCPCS: Performed by: INTERNAL MEDICINE

## 2020-09-11 PROCEDURE — 2580000003 HC RX 258: Performed by: INTERNAL MEDICINE

## 2020-09-11 PROCEDURE — 1200000000 HC SEMI PRIVATE

## 2020-09-11 PROCEDURE — 2500000003 HC RX 250 WO HCPCS: Performed by: SURGERY

## 2020-09-11 PROCEDURE — 6360000002 HC RX W HCPCS: Performed by: NURSE ANESTHETIST, CERTIFIED REGISTERED

## 2020-09-11 PROCEDURE — 6360000002 HC RX W HCPCS: Performed by: FAMILY MEDICINE

## 2020-09-11 PROCEDURE — 36415 COLL VENOUS BLD VENIPUNCTURE: CPT

## 2020-09-11 PROCEDURE — 3700000000 HC ANESTHESIA ATTENDED CARE: Performed by: SURGERY

## 2020-09-11 PROCEDURE — 6360000002 HC RX W HCPCS: Performed by: SURGERY

## 2020-09-11 PROCEDURE — 2709999900 HC NON-CHARGEABLE SUPPLY: Performed by: SURGERY

## 2020-09-11 PROCEDURE — 7100000001 HC PACU RECOVERY - ADDTL 15 MIN: Performed by: SURGERY

## 2020-09-11 PROCEDURE — 0DTF4ZZ RESECTION OF RIGHT LARGE INTESTINE, PERCUTANEOUS ENDOSCOPIC APPROACH: ICD-10-PCS | Performed by: SURGERY

## 2020-09-11 PROCEDURE — 7100000000 HC PACU RECOVERY - FIRST 15 MIN: Performed by: SURGERY

## 2020-09-11 PROCEDURE — 2500000003 HC RX 250 WO HCPCS: Performed by: NURSE ANESTHETIST, CERTIFIED REGISTERED

## 2020-09-11 PROCEDURE — 88309 TISSUE EXAM BY PATHOLOGIST: CPT

## 2020-09-11 PROCEDURE — 2580000003 HC RX 258: Performed by: NURSE ANESTHETIST, CERTIFIED REGISTERED

## 2020-09-11 PROCEDURE — 3600000014 HC SURGERY LEVEL 4 ADDTL 15MIN: Performed by: SURGERY

## 2020-09-11 PROCEDURE — 2720000010 HC SURG SUPPLY STERILE: Performed by: SURGERY

## 2020-09-11 PROCEDURE — 3600000004 HC SURGERY LEVEL 4 BASE: Performed by: SURGERY

## 2020-09-11 PROCEDURE — 2580000003 HC RX 258: Performed by: SURGERY

## 2020-09-11 PROCEDURE — 99232 SBSQ HOSP IP/OBS MODERATE 35: CPT | Performed by: SURGERY

## 2020-09-11 PROCEDURE — 3700000001 HC ADD 15 MINUTES (ANESTHESIA): Performed by: SURGERY

## 2020-09-11 PROCEDURE — 2500000003 HC RX 250 WO HCPCS: Performed by: INTERNAL MEDICINE

## 2020-09-11 PROCEDURE — 2580000003 HC RX 258: Performed by: NURSE PRACTITIONER

## 2020-09-11 PROCEDURE — APPNB30 APP NON BILLABLE TIME 0-30 MINS: Performed by: NURSE PRACTITIONER

## 2020-09-11 PROCEDURE — 84703 CHORIONIC GONADOTROPIN ASSAY: CPT

## 2020-09-11 RX ORDER — SODIUM CHLORIDE 9 MG/ML
INJECTION, SOLUTION INTRAVENOUS CONTINUOUS PRN
Status: DISCONTINUED | OUTPATIENT
Start: 2020-09-11 | End: 2020-09-11 | Stop reason: SDUPTHER

## 2020-09-11 RX ORDER — HYDROMORPHONE HCL 110MG/55ML
PATIENT CONTROLLED ANALGESIA SYRINGE INTRAVENOUS PRN
Status: DISCONTINUED | OUTPATIENT
Start: 2020-09-11 | End: 2020-09-11 | Stop reason: SDUPTHER

## 2020-09-11 RX ORDER — ONDANSETRON 2 MG/ML
4 INJECTION INTRAMUSCULAR; INTRAVENOUS EVERY 6 HOURS PRN
Status: DISCONTINUED | OUTPATIENT
Start: 2020-09-11 | End: 2020-09-15 | Stop reason: HOSPADM

## 2020-09-11 RX ORDER — MAGNESIUM HYDROXIDE 1200 MG/15ML
LIQUID ORAL
Status: COMPLETED | OUTPATIENT
Start: 2020-09-11 | End: 2020-09-11

## 2020-09-11 RX ORDER — DEXAMETHASONE SODIUM PHOSPHATE 4 MG/ML
INJECTION, SOLUTION INTRA-ARTICULAR; INTRALESIONAL; INTRAMUSCULAR; INTRAVENOUS; SOFT TISSUE PRN
Status: DISCONTINUED | OUTPATIENT
Start: 2020-09-11 | End: 2020-09-11 | Stop reason: SDUPTHER

## 2020-09-11 RX ORDER — DIPHENHYDRAMINE HYDROCHLORIDE 50 MG/ML
12.5 INJECTION INTRAMUSCULAR; INTRAVENOUS
Status: DISCONTINUED | OUTPATIENT
Start: 2020-09-11 | End: 2020-09-11 | Stop reason: HOSPADM

## 2020-09-11 RX ORDER — SODIUM CHLORIDE, SODIUM LACTATE, POTASSIUM CHLORIDE, CALCIUM CHLORIDE 600; 310; 30; 20 MG/100ML; MG/100ML; MG/100ML; MG/100ML
INJECTION, SOLUTION INTRAVENOUS CONTINUOUS PRN
Status: DISCONTINUED | OUTPATIENT
Start: 2020-09-11 | End: 2020-09-11 | Stop reason: SDUPTHER

## 2020-09-11 RX ORDER — GLYCOPYRROLATE 1 MG/5 ML
SYRINGE (ML) INTRAVENOUS PRN
Status: DISCONTINUED | OUTPATIENT
Start: 2020-09-11 | End: 2020-09-11 | Stop reason: SDUPTHER

## 2020-09-11 RX ORDER — MAGNESIUM SULFATE HEPTAHYDRATE 500 MG/ML
INJECTION, SOLUTION INTRAMUSCULAR; INTRAVENOUS PRN
Status: DISCONTINUED | OUTPATIENT
Start: 2020-09-11 | End: 2020-09-11 | Stop reason: SDUPTHER

## 2020-09-11 RX ORDER — HYDROMORPHONE HCL 110MG/55ML
0.5 PATIENT CONTROLLED ANALGESIA SYRINGE INTRAVENOUS EVERY 5 MIN PRN
Status: COMPLETED | OUTPATIENT
Start: 2020-09-11 | End: 2020-09-11

## 2020-09-11 RX ORDER — PROMETHAZINE HYDROCHLORIDE 25 MG/ML
6.25 INJECTION, SOLUTION INTRAMUSCULAR; INTRAVENOUS PRN
Status: DISCONTINUED | OUTPATIENT
Start: 2020-09-11 | End: 2020-09-11 | Stop reason: HOSPADM

## 2020-09-11 RX ORDER — HYDROMORPHONE HCL 110MG/55ML
0.25 PATIENT CONTROLLED ANALGESIA SYRINGE INTRAVENOUS EVERY 5 MIN PRN
Status: DISCONTINUED | OUTPATIENT
Start: 2020-09-11 | End: 2020-09-11 | Stop reason: HOSPADM

## 2020-09-11 RX ORDER — PROPOFOL 10 MG/ML
INJECTION, EMULSION INTRAVENOUS PRN
Status: DISCONTINUED | OUTPATIENT
Start: 2020-09-11 | End: 2020-09-11 | Stop reason: SDUPTHER

## 2020-09-11 RX ORDER — ONDANSETRON 2 MG/ML
INJECTION INTRAMUSCULAR; INTRAVENOUS PRN
Status: DISCONTINUED | OUTPATIENT
Start: 2020-09-11 | End: 2020-09-11 | Stop reason: SDUPTHER

## 2020-09-11 RX ORDER — SUCCINYLCHOLINE/SOD CL,ISO/PF 200MG/10ML
SYRINGE (ML) INTRAVENOUS PRN
Status: DISCONTINUED | OUTPATIENT
Start: 2020-09-11 | End: 2020-09-11 | Stop reason: SDUPTHER

## 2020-09-11 RX ORDER — NEOSTIGMINE METHYLSULFATE 5 MG/5 ML
SYRINGE (ML) INTRAVENOUS PRN
Status: DISCONTINUED | OUTPATIENT
Start: 2020-09-11 | End: 2020-09-11 | Stop reason: SDUPTHER

## 2020-09-11 RX ORDER — MIDAZOLAM HYDROCHLORIDE 1 MG/ML
INJECTION INTRAMUSCULAR; INTRAVENOUS PRN
Status: DISCONTINUED | OUTPATIENT
Start: 2020-09-11 | End: 2020-09-11 | Stop reason: SDUPTHER

## 2020-09-11 RX ORDER — SODIUM CHLORIDE 9 MG/ML
INJECTION, SOLUTION INTRAVENOUS CONTINUOUS
Status: DISCONTINUED | OUTPATIENT
Start: 2020-09-11 | End: 2020-09-15 | Stop reason: HOSPADM

## 2020-09-11 RX ORDER — KETAMINE HCL IN NACL, ISO-OSM 100MG/10ML
SYRINGE (ML) INJECTION PRN
Status: DISCONTINUED | OUTPATIENT
Start: 2020-09-11 | End: 2020-09-11 | Stop reason: SDUPTHER

## 2020-09-11 RX ORDER — OXYCODONE HYDROCHLORIDE 5 MG/1
5 TABLET ORAL PRN
Status: DISCONTINUED | OUTPATIENT
Start: 2020-09-11 | End: 2020-09-11 | Stop reason: HOSPADM

## 2020-09-11 RX ORDER — FENTANYL CITRATE 50 UG/ML
INJECTION, SOLUTION INTRAMUSCULAR; INTRAVENOUS PRN
Status: DISCONTINUED | OUTPATIENT
Start: 2020-09-11 | End: 2020-09-11 | Stop reason: SDUPTHER

## 2020-09-11 RX ORDER — FENTANYL CITRATE 50 UG/ML
50 INJECTION, SOLUTION INTRAMUSCULAR; INTRAVENOUS EVERY 5 MIN PRN
Status: DISCONTINUED | OUTPATIENT
Start: 2020-09-11 | End: 2020-09-11 | Stop reason: HOSPADM

## 2020-09-11 RX ORDER — MEPERIDINE HYDROCHLORIDE 25 MG/ML
12.5 INJECTION INTRAMUSCULAR; INTRAVENOUS; SUBCUTANEOUS EVERY 5 MIN PRN
Status: DISCONTINUED | OUTPATIENT
Start: 2020-09-11 | End: 2020-09-11 | Stop reason: HOSPADM

## 2020-09-11 RX ORDER — BUPIVACAINE HYDROCHLORIDE AND EPINEPHRINE 5; 5 MG/ML; UG/ML
INJECTION, SOLUTION EPIDURAL; INTRACAUDAL; PERINEURAL
Status: COMPLETED | OUTPATIENT
Start: 2020-09-11 | End: 2020-09-11

## 2020-09-11 RX ORDER — PHENYLEPHRINE HCL IN 0.9% NACL 1 MG/10 ML
SYRINGE (ML) INTRAVENOUS PRN
Status: DISCONTINUED | OUTPATIENT
Start: 2020-09-11 | End: 2020-09-11 | Stop reason: SDUPTHER

## 2020-09-11 RX ORDER — SODIUM CHLORIDE, SODIUM LACTATE, POTASSIUM CHLORIDE, AND CALCIUM CHLORIDE .6; .31; .03; .02 G/100ML; G/100ML; G/100ML; G/100ML
IRRIGANT IRRIGATION
Status: COMPLETED | OUTPATIENT
Start: 2020-09-11 | End: 2020-09-11

## 2020-09-11 RX ORDER — LIDOCAINE HYDROCHLORIDE 20 MG/ML
INJECTION, SOLUTION EPIDURAL; INFILTRATION; INTRACAUDAL; PERINEURAL PRN
Status: DISCONTINUED | OUTPATIENT
Start: 2020-09-11 | End: 2020-09-11 | Stop reason: SDUPTHER

## 2020-09-11 RX ORDER — MAGNESIUM HYDROXIDE 1200 MG/15ML
LIQUID ORAL CONTINUOUS PRN
Status: COMPLETED | OUTPATIENT
Start: 2020-09-11 | End: 2020-09-11

## 2020-09-11 RX ORDER — LABETALOL HYDROCHLORIDE 5 MG/ML
5 INJECTION, SOLUTION INTRAVENOUS EVERY 10 MIN PRN
Status: DISCONTINUED | OUTPATIENT
Start: 2020-09-11 | End: 2020-09-11 | Stop reason: HOSPADM

## 2020-09-11 RX ORDER — VECURONIUM BROMIDE 1 MG/ML
INJECTION, POWDER, LYOPHILIZED, FOR SOLUTION INTRAVENOUS PRN
Status: DISCONTINUED | OUTPATIENT
Start: 2020-09-11 | End: 2020-09-11 | Stop reason: SDUPTHER

## 2020-09-11 RX ORDER — OXYCODONE HYDROCHLORIDE 5 MG/1
10 TABLET ORAL PRN
Status: DISCONTINUED | OUTPATIENT
Start: 2020-09-11 | End: 2020-09-11 | Stop reason: HOSPADM

## 2020-09-11 RX ADMIN — MAGNESIUM SULFATE HEPTAHYDRATE 1 G: 500 INJECTION, SOLUTION INTRAMUSCULAR; INTRAVENOUS at 11:38

## 2020-09-11 RX ADMIN — FENTANYL CITRATE 100 MCG: 50 INJECTION, SOLUTION INTRAMUSCULAR; INTRAVENOUS at 11:30

## 2020-09-11 RX ADMIN — MIDAZOLAM 2 MG: 1 INJECTION INTRAMUSCULAR; INTRAVENOUS at 11:23

## 2020-09-11 RX ADMIN — HYDROMORPHONE HYDROCHLORIDE 1 MG: 1 INJECTION, SOLUTION INTRAMUSCULAR; INTRAVENOUS; SUBCUTANEOUS at 17:30

## 2020-09-11 RX ADMIN — VECURONIUM BROMIDE 7 MG: 1 INJECTION, POWDER, LYOPHILIZED, FOR SOLUTION INTRAVENOUS at 11:35

## 2020-09-11 RX ADMIN — DEXAMETHASONE SODIUM PHOSPHATE 8 MG: 4 INJECTION, SOLUTION INTRAMUSCULAR; INTRAVENOUS at 11:45

## 2020-09-11 RX ADMIN — HYDROMORPHONE HYDROCHLORIDE 0.5 MG: 2 INJECTION, SOLUTION INTRAMUSCULAR; INTRAVENOUS; SUBCUTANEOUS at 12:09

## 2020-09-11 RX ADMIN — HYDROMORPHONE HYDROCHLORIDE 1 MG: 1 INJECTION, SOLUTION INTRAMUSCULAR; INTRAVENOUS; SUBCUTANEOUS at 20:08

## 2020-09-11 RX ADMIN — HYDROMORPHONE HYDROCHLORIDE 0.5 MG: 2 INJECTION, SOLUTION INTRAMUSCULAR; INTRAVENOUS; SUBCUTANEOUS at 14:14

## 2020-09-11 RX ADMIN — FENTANYL CITRATE 50 MCG: 50 INJECTION, SOLUTION INTRAMUSCULAR; INTRAVENOUS at 14:29

## 2020-09-11 RX ADMIN — SODIUM CHLORIDE: 9 INJECTION, SOLUTION INTRAVENOUS at 14:13

## 2020-09-11 RX ADMIN — HYDROMORPHONE HYDROCHLORIDE 0.5 MG: 2 INJECTION, SOLUTION INTRAMUSCULAR; INTRAVENOUS; SUBCUTANEOUS at 13:48

## 2020-09-11 RX ADMIN — ONDANSETRON 4 MG: 2 INJECTION INTRAMUSCULAR; INTRAVENOUS at 04:37

## 2020-09-11 RX ADMIN — HYDROMORPHONE HYDROCHLORIDE 0.5 MG: 2 INJECTION, SOLUTION INTRAMUSCULAR; INTRAVENOUS; SUBCUTANEOUS at 14:07

## 2020-09-11 RX ADMIN — METRONIDAZOLE 500 MG: 500 INJECTION, SOLUTION INTRAVENOUS at 04:37

## 2020-09-11 RX ADMIN — Medication 0.4 MG: at 13:20

## 2020-09-11 RX ADMIN — SODIUM CHLORIDE: 9 INJECTION, SOLUTION INTRAVENOUS at 11:25

## 2020-09-11 RX ADMIN — HYDROMORPHONE HYDROCHLORIDE 0.5 MG: 2 INJECTION, SOLUTION INTRAMUSCULAR; INTRAVENOUS; SUBCUTANEOUS at 13:53

## 2020-09-11 RX ADMIN — Medication 10 ML: at 08:23

## 2020-09-11 RX ADMIN — Medication 10 MG: at 12:46

## 2020-09-11 RX ADMIN — CIPROFLOXACIN 400 MG: 2 INJECTION, SOLUTION INTRAVENOUS at 08:23

## 2020-09-11 RX ADMIN — MORPHINE SULFATE 4 MG: 4 INJECTION INTRAVENOUS at 00:40

## 2020-09-11 RX ADMIN — Medication 0.2 MG: at 11:45

## 2020-09-11 RX ADMIN — MORPHINE SULFATE 4 MG: 4 INJECTION INTRAVENOUS at 08:23

## 2020-09-11 RX ADMIN — LIDOCAINE HYDROCHLORIDE 80 MG: 20 INJECTION, SOLUTION EPIDURAL; INFILTRATION; INTRACAUDAL; PERINEURAL at 11:31

## 2020-09-11 RX ADMIN — Medication 3 MG: at 13:20

## 2020-09-11 RX ADMIN — PROPOFOL 200 MG: 10 INJECTION, EMULSION INTRAVENOUS at 11:31

## 2020-09-11 RX ADMIN — SODIUM CHLORIDE, POTASSIUM CHLORIDE, SODIUM LACTATE AND CALCIUM CHLORIDE: 600; 310; 30; 20 INJECTION, SOLUTION INTRAVENOUS at 11:51

## 2020-09-11 RX ADMIN — METRONIDAZOLE 500 MG: 500 INJECTION, SOLUTION INTRAVENOUS at 12:57

## 2020-09-11 RX ADMIN — Medication 100 MCG: at 11:45

## 2020-09-11 RX ADMIN — MORPHINE SULFATE 4 MG: 4 INJECTION INTRAVENOUS at 04:37

## 2020-09-11 RX ADMIN — HYDROMORPHONE HYDROCHLORIDE 1 MG: 1 INJECTION, SOLUTION INTRAMUSCULAR; INTRAVENOUS; SUBCUTANEOUS at 15:22

## 2020-09-11 RX ADMIN — HYDROMORPHONE HYDROCHLORIDE 0.5 MG: 2 INJECTION, SOLUTION INTRAMUSCULAR; INTRAVENOUS; SUBCUTANEOUS at 11:58

## 2020-09-11 RX ADMIN — HYDROMORPHONE HYDROCHLORIDE 1 MG: 1 INJECTION, SOLUTION INTRAMUSCULAR; INTRAVENOUS; SUBCUTANEOUS at 22:07

## 2020-09-11 RX ADMIN — Medication 120 MG: at 11:32

## 2020-09-11 RX ADMIN — Medication 20 MG: at 11:48

## 2020-09-11 RX ADMIN — VECURONIUM BROMIDE 1 MG: 1 INJECTION, POWDER, LYOPHILIZED, FOR SOLUTION INTRAVENOUS at 12:54

## 2020-09-11 RX ADMIN — ONDANSETRON 4 MG: 2 INJECTION INTRAMUSCULAR; INTRAVENOUS at 12:59

## 2020-09-11 ASSESSMENT — PULMONARY FUNCTION TESTS
PIF_VALUE: 14
PIF_VALUE: 21
PIF_VALUE: 17
PIF_VALUE: 17
PIF_VALUE: 0
PIF_VALUE: 1
PIF_VALUE: 18
PIF_VALUE: 20
PIF_VALUE: 15
PIF_VALUE: 19
PIF_VALUE: 20
PIF_VALUE: 20
PIF_VALUE: 17
PIF_VALUE: 15
PIF_VALUE: 21
PIF_VALUE: 20
PIF_VALUE: 14
PIF_VALUE: 1
PIF_VALUE: 16
PIF_VALUE: 15
PIF_VALUE: 15
PIF_VALUE: 17
PIF_VALUE: 20
PIF_VALUE: 0
PIF_VALUE: 15
PIF_VALUE: 14
PIF_VALUE: 17
PIF_VALUE: 15
PIF_VALUE: 21
PIF_VALUE: 18
PIF_VALUE: 17
PIF_VALUE: 15
PIF_VALUE: 18
PIF_VALUE: 30
PIF_VALUE: 17
PIF_VALUE: 15
PIF_VALUE: 15
PIF_VALUE: 3
PIF_VALUE: 15
PIF_VALUE: 20
PIF_VALUE: 27
PIF_VALUE: 19
PIF_VALUE: 20
PIF_VALUE: 13
PIF_VALUE: 15
PIF_VALUE: 15
PIF_VALUE: 17
PIF_VALUE: 16
PIF_VALUE: 17
PIF_VALUE: 1
PIF_VALUE: 17
PIF_VALUE: 17
PIF_VALUE: 15
PIF_VALUE: 15
PIF_VALUE: 19
PIF_VALUE: 15
PIF_VALUE: 20
PIF_VALUE: 20
PIF_VALUE: 15
PIF_VALUE: 17
PIF_VALUE: 14
PIF_VALUE: 17
PIF_VALUE: 16
PIF_VALUE: 16
PIF_VALUE: 15
PIF_VALUE: 14
PIF_VALUE: 16
PIF_VALUE: 4
PIF_VALUE: 18
PIF_VALUE: 20
PIF_VALUE: 17
PIF_VALUE: 17
PIF_VALUE: 3
PIF_VALUE: 15
PIF_VALUE: 5
PIF_VALUE: 15
PIF_VALUE: 17
PIF_VALUE: 15
PIF_VALUE: 15
PIF_VALUE: 14
PIF_VALUE: 16
PIF_VALUE: 15
PIF_VALUE: 17
PIF_VALUE: 19
PIF_VALUE: 18
PIF_VALUE: 17
PIF_VALUE: 8
PIF_VALUE: 18
PIF_VALUE: 15
PIF_VALUE: 19
PIF_VALUE: 0
PIF_VALUE: 17
PIF_VALUE: 18
PIF_VALUE: 18
PIF_VALUE: 13
PIF_VALUE: 19
PIF_VALUE: 17
PIF_VALUE: 20
PIF_VALUE: 14
PIF_VALUE: 17
PIF_VALUE: 19
PIF_VALUE: 18
PIF_VALUE: 17
PIF_VALUE: 13
PIF_VALUE: 19
PIF_VALUE: 17
PIF_VALUE: 19
PIF_VALUE: 18
PIF_VALUE: 17
PIF_VALUE: 15
PIF_VALUE: 19
PIF_VALUE: 13
PIF_VALUE: 17
PIF_VALUE: 18
PIF_VALUE: 20
PIF_VALUE: 12
PIF_VALUE: 14
PIF_VALUE: 19
PIF_VALUE: 19
PIF_VALUE: 15
PIF_VALUE: 18
PIF_VALUE: 18
PIF_VALUE: 31
PIF_VALUE: 17
PIF_VALUE: 17
PIF_VALUE: 2

## 2020-09-11 ASSESSMENT — PAIN DESCRIPTION - PROGRESSION
CLINICAL_PROGRESSION: GRADUALLY IMPROVING
CLINICAL_PROGRESSION: GRADUALLY IMPROVING
CLINICAL_PROGRESSION: NOT CHANGED

## 2020-09-11 ASSESSMENT — PAIN DESCRIPTION - LOCATION
LOCATION: ABDOMEN

## 2020-09-11 ASSESSMENT — PAIN SCALES - GENERAL
PAINLEVEL_OUTOF10: 3
PAINLEVEL_OUTOF10: 10
PAINLEVEL_OUTOF10: 3
PAINLEVEL_OUTOF10: 10
PAINLEVEL_OUTOF10: 8
PAINLEVEL_OUTOF10: 6
PAINLEVEL_OUTOF10: 7
PAINLEVEL_OUTOF10: 4
PAINLEVEL_OUTOF10: 9
PAINLEVEL_OUTOF10: 9
PAINLEVEL_OUTOF10: 8
PAINLEVEL_OUTOF10: 8
PAINLEVEL_OUTOF10: 0
PAINLEVEL_OUTOF10: 9

## 2020-09-11 ASSESSMENT — PAIN - FUNCTIONAL ASSESSMENT
PAIN_FUNCTIONAL_ASSESSMENT: ACTIVITIES ARE NOT PREVENTED
PAIN_FUNCTIONAL_ASSESSMENT: ACTIVITIES ARE NOT PREVENTED

## 2020-09-11 ASSESSMENT — PAIN DESCRIPTION - DESCRIPTORS
DESCRIPTORS: CRAMPING;DISCOMFORT
DESCRIPTORS: DISCOMFORT;CRAMPING

## 2020-09-11 ASSESSMENT — PAIN DESCRIPTION - PAIN TYPE
TYPE: SURGICAL PAIN
TYPE: ACUTE PAIN
TYPE: ACUTE PAIN
TYPE: SURGICAL PAIN
TYPE: ACUTE PAIN
TYPE: ACUTE PAIN
TYPE: SURGICAL PAIN

## 2020-09-11 ASSESSMENT — PAIN DESCRIPTION - ONSET
ONSET: ON-GOING
ONSET: ON-GOING

## 2020-09-11 ASSESSMENT — PAIN DESCRIPTION - FREQUENCY
FREQUENCY: CONTINUOUS
FREQUENCY: CONTINUOUS

## 2020-09-11 NOTE — PROGRESS NOTES
[address] [place] [address] PROGRESS NOTE    9/11/2020 3:20 PM        Name: Tahira Butler . Admitted: 9/9/2020  Primary Care Provider: Liliana Aquino MD (Tel: [phone])      Subjective:  Patient is a 31 yo female with hx anxiety/depression. She presented to ER with c/o abdominal pain which has been off and on for the past 5 months but worse over the past few days. Associated with n/v/d. Evaluated in ER on Tuesday. CT scan showed questionable mass vs inflammation and she was discharged home on Cipro and Flagyl. The pain worsened prompting return to hospital.     9/11/2020: laparoscopic right hemicolectomy    Presently resting in bed. Patient underwent lap hemicolectomy this am and recently returned to unit. She has operative pain but no nausea. Has ice pack to abdomen. Denies shortness of breath.     Reviewed interval ancillary notes    Current Medications  HYDROmorphone (DILAUDID) injection 0.25 mg, Q5 Min PRN  fentaNYL (SUBLIMAZE) injection 50 mcg, Q5 Min PRN  HYDROmorphone (DILAUDID) injection 0.25 mg, Q5 Min PRN  HYDROmorphone (DILAUDID) injection 0.5 mg, Q5 Min PRN  oxyCODONE (ROXICODONE) immediate release tablet 5 mg, PRN    Or  oxyCODONE (ROXICODONE) immediate release tablet 10 mg, PRN  diphenhydrAMINE (BENADRYL) injection 12.5 mg, Once PRN  promethazine (PHENERGAN) injection 6.25 mg, PRN  labetalol (NORMODYNE;TRANDATE) injection 5 mg, Q10 Min PRN  meperidine (DEMEROL) injection 12.5 mg, Q5 Min PRN  sodium chloride 0.9 % irrigation, Continuous PRN  morphine injection 4 mg, Q3H PRN  ciprofloxacin (CIPRO) IVPB 400 mg, Q12H  metronidazole (FLAGYL) 500 mg in NaCl 100 mL IVPB premix, Q8H  ondansetron (ZOFRAN) injection 4 mg, Q30 Min PRN  HYDROcodone-acetaminophen (NORCO) 5-325 MG per tablet 1 tablet, Q4H PRN  pantoprazole (PROTONIX) tablet 40 mg, QAM AC  sodium chloride flush 0.9 % injection 10 mL, 2 times per day  sodium chloride flush 0.9 % injection 10 mL, PRN  acetaminophen (TYLENOL) tablet 650 mg, Q6H PRN    Or  acetaminophen (TYLENOL) suppository 650 mg, Q6H PRN  polyethylene glycol (GLYCOLAX) packet 17 g, Daily PRN  promethazine (PHENERGAN) tablet 12.5 mg, Q6H PRN    Or  ondansetron (ZOFRAN) injection 4 mg, Q6H PRN  enoxaparin (LOVENOX) injection 40 mg, Nightly    propofol injection, PRN  lidocaine PF 2 % injection, PRN  succinylcholine (ANECTINE) injection, PRN  vecuronium (NORCURON) injection, PRN  Dexamethasone Sodium Phosphate injection, PRN  midazolam (VERSED) injection, PRN  fentaNYL (SUBLIMAZE) injection, PRN  ketamine (KETALAR) injection, PRN  phenylephrine (SUSAN-SYNEPHRINE) 1 MG/10ML prefilled syringe, PRN  glycopyrrolate (ROBINUL) injection, PRN  0.9 % sodium chloride infusion, Continuous PRN  lactated ringers infusion, Continuous PRN  magnesium sulfate injection, PRN  HYDROmorphone (DILAUDID) injection, PRN  ondansetron (ZOFRAN) injection, PRN        Objective:  /75   Pulse 53   Temp 97.9 °F (36.6 °C) (Oral)   Resp 16   Ht 5' 9\" (1.753 m)   Wt 186 lb (84.4 kg)   SpO2 96%   BMI 27.47 kg/m²     Intake/Output Summary (Last 24 hours) at 9/11/2020 1306  Last data filed at 9/11/2020 1151  Gross per 24 hour   Intake 1550 ml   Output --   Net 1550 ml      Wt Readings from Last 3 Encounters:   09/09/20 186 lb (84.4 kg)   09/08/20 186 lb (84.4 kg)   01/06/20 195 lb 1 oz (88.5 kg)     General:  Awake, alert, oriented in NAD  Skin:  Warm and dry. Neck:  Supple. Chest:  Normal effort.   Clear to auscultation, no wheezes/rhonchi/rales  Cardiovascular:  RRR, normal S1/S2, no murmur/gallop/rub  Abdomen:  Soft, + operative tenderness, +bowel sounds  Extremities:  No edema  Neurological: No focal deficits  Psychological: Normal mood and affect    Labs and Tests:  CBC:   Recent Labs     09/09/20  1202 09/10/20  0642   WBC 12.0* 9.5   HGB 14.3 14.1    273     BMP:    Recent Labs     09/09/20  1202 09/10/20  6568  142   K 4.0 4.4    109   CO2 22 23   BUN 5* 5*   CREATININE 0.8 1.0   GLUCOSE 106* 88     Hepatic:   Recent Labs     09/09/20  1202   AST 20   ALT 16   BILITOT 0.5   ALKPHOS 72       CT Abdomen and Pelvis 9/8/2020:  FINDINGS:    Lower Chest: No evidence of pneumonia or other acute findings.         Organs: There is a low-density lesion in the posterior dome of the liver    measuring 1.8 cm.  Margins appear ill-defined.  Normal appearance of the    gallbladder.  No evidence of pancreatitis.  No ureteral stone or    hydronephrosis. No evidence of pyelonephritis.         GI/Bowel: At the ileocecal valve there is rounded increased density.  This is    not well evaluated due to collapse and lack of contrast.  Adjacent stranding    of fat appears present, and there appears to be mild wall thickening of the    adjacent cecum.  Prominent adjacent lymph nodes are present in the right    lower quadrant measuring up to 1.4 x 0.8 cm, mildly enlarged.  No bowel    obstruction.  Normal appearance of the appendix.         Pelvis: No acute abnormality of the pelvis. No evidence of cystitis.         Peritoneum/Retroperitoneum: No free air, ascites or abscess.         Bones/Soft Tissues: No fracture or other acute osseous process.              Impression    Masslike area at the ileocecal valve and adjacent cecum.  This may either be    due to malignancy or focal infection.  Adjacent metastatic versus    reactive/inflammatory mildly enlarged lymph nodes.  Colonoscopy recommended.         Indeterminate low-density lesion at the dome of the liver which may be a    metastatic focus. Parker Gaudier also in the differential diagnosis.  If further    imaging evaluation is needed a pre and post contrast liver mass protocol MRI    examination would be recommended.             Colonoscopy 9/10/2020:  IMPRESSION :  Friable ulcerated mass involving the ICV and deeper into  the cecum. Separate from the append orifice.  Unable to  intubate ileum. PLAN :   Await path - rush put on path  surgical consult  CEA  clears until seen by surgery      Problem List  Active Problems:    Abdominal pain    Cecum mass  Resolved Problems:    * No resolved hospital problems. *       Assessment & Plan:   1. Cecal mass. Abnormal CT Scan showing mass like area at ICV and cecum. Colonoscopy 9/10 revealed friable ulcerated mass involving the ICV and deeper into the cecum. Pathology showed high grade dysplasia, adenocarcinoma could not be excluded. She is s/p lap right hemicolectomy today. Presently NPO except ice chips. Continue IV fluids, prn pain meds. CBC and CMP in am.   2. Liver lesion. CT scan noted lesion in dome of liver concerning for hemangioma vs mets. Will need MRI to further characterize.        Diet: Diet NPO Effective Now Exceptions are: Ice Chips, Sips with Meds, Popsicles  Code:Full Code  DVT PPX: enoxaparin      VIV Scott - CNP   9/11/2020 3:21 PM

## 2020-09-11 NOTE — OP NOTE
[facility] 124                     [address] [facility], [address] [place] [address]                                OPERATIVE REPORT    PATIENT NAME: Cheryl Cheng                  :        1985  MED REC NO:   [id number]                          ROOM:       [id number]  ACCOUNT NO:   [de-identified]                           ADMIT DATE: 2020  PROVIDER:     Alexi Luo MD    DATE OF PROCEDURE:  2020    PREOPERATIVE DIAGNOSIS:  Cecal mass. POSTOPERATIVE DIAGNOSIS:  Cecal mass. PROCEDURE:  Laparoscopic right hemicolectomy. SURGEON:  Charline Maya MD    ANESTHESIA:  General endotracheal.    ESTIMATED BLOOD LOSS:  Minimal.    COMPLICATIONS:  None. SPECIMEN:  Terminal ileum, right colon. OPERATIVE INDICATION AND CONSENT:  The patient is a 64-year-old female  with a several week history of right-sided abdominal pain. She was  treated with antibiotics, but had no improvement. She underwent  colonoscopy yesterday which showed an ulcerated mass within the cecum. Biopsies showed at least high-grade dysplasia. The patient was brought  to the operating room today for laparoscopic right hemicolectomy. She  was explained the risks, benefits, and possible complications including  risk of bleeding, bowel injury or anastomotic leakage. DETAILS OF THE PROCEDURE:  The patient was brought to the operative  suite and placed in the supine position on the operative table. After  general endotracheal anesthesia, she was prepped and draped in the usual  sterile fashion. We made a 5-mm curvilinear incision at the umbilicus. A Veress needle was passed into the peritoneal cavity and after adequate  insufflation, a 5-mm Optiview trocar was placed at this site. A 5-mm  subxiphoid trocar was placed followed by a 5-mm trocar in the suprapubic  location. We also placed a 5-mm trocar in the left upper quadrant.     There was some omental adhesions to the midline abdominal wall.  These  were taken down with the LigaSure. There were also some omental  adhesions in the right lower quadrant which were taken down with the  LigaSure. There was no significant evidence of inflammatory changes in the right  lower quadrant. The cecum was mobilized of all attachments using the  LigaSure. The right colon was then mobilized along the white line of  Toldt. We began dividing the gastrocolic ligament at about the midline  and then completely mobilized the hepatic flexure of the colon. We marked out an extraction site on the right side just above the  umbilicus. This area was injected with 0.5% Marcaine with epinephrine. We then made a 12-mm incision and placed a 12-mm trocar at this site. The terminal ileum was dissected free circumferentially and then divided  with a transverse firing of a blue Endo KATHY stapler load. The mesentery  was divided using the LigaSure. The ileocolonic vessels were divided at  their base to ensure adequate lymph node harvest.  We continued to  divide the mesentery to the right colon and hepatic flexure using the  LigaSure. We approached the transverse colon at our anticipated site of  distal transection. At the site where the 12-mm trocar was placed in the right mid abdomen,  the incision was enlarged to about 5-cm. Dissection was carried down to  the level of the external abdominal oblique which was opened  transversely. The trocar was then removed. We traversed the rectus  abdominis via muscle splitting fashion and then opened the posterior  fascia and peritoneum. An Rogelio retractor was then placed into the  wound. The colon was delivered into the wound. We then further  dissected the transverse colon free circumferentially of all fatty  tissue and then it was divided with a transverse firing of a blue KATHY 75  stapler load. Both the colon and small bowel reintroduced into the  abdomen. The abdomen was then re-insufflated.   We checked for proper  orientation of the small bowel and colon before _____. The bowel was  then brought up again through the [place] retractor. We dissected the  small bowel about 2-cm proximally and then placed a new KATHY 75 stapler  across it for a fresh staple line. The corners of the previously placed staple lines were cut. A KATHY 75  blue stapler load was placed into both openings and fired creating a  side-to-side anastomosis. The anastomosis was completed with one last  firing of a KATHY 75 blue stapler load. The mesenteric defect was left  open. The anastomosis was oversewn with interrupted 3-0 silk sutures. We also placed sutures at the junctions of the staple line. After  carefully evaluating the anastomosis, it was reintroduced into the  abdominal cavity. The line cap was placed on the Rogelio retractor. We  then carefully surveyed the abdomen. We had excellent hemostasis. The  omentum was draped over the anastomosis and then the trocars were  removed under direct visualization. The Rogelio retractor was also  removed. Gloves were changed. We closed the posterior fascia and peritoneum of  the extraction site with a running 0 Vicryl suture. The external  abdominal oblique of this incision was closed with a running 0 loop PDS  suture. The subcutaneous tissue was painted with Betadine before it was  closed with interrupted 3-0 Vicryl sutures. The skin of all the  incisions were closed with running 4-0 subcuticular sutures as well. Dermabond was then applied. The patient tolerated the procedure without  difficulty and was transferred to recovery room in stable condition. Yasmin Wynn. Jesus Gresham MD    D: 09/11/2020 14:15:44       T: 09/11/2020 14:26:35     [id number]  Job#: [id number]     Doc#: [id number]    CC:  MD Magui Erickson MD

## 2020-09-11 NOTE — ANESTHESIA PRE PROCEDURE
Department of Anesthesiology  Preprocedure Note       Name:  Bridger Palacio   Age:  30 y.o.  :  1985                                          MRN:  [id number]         Date:  2020      Surgeon: Elva Lei):  Shantel Nicolas MD    Procedure: Procedure(s):  RIGHT BOWEL RESECTION HEMICOLECTOMY LAPAROSCOPIC    Medications prior to admission:   Prior to Admission medications    Medication Sig Start Date End Date Taking? Authorizing Provider   HYDROcodone-acetaminophen (NORCO) 5-325 MG per tablet Take 1 tablet by mouth every 4 hours as needed for Pain for up to 3 days. Intended supply: 3 days.  Take lowest dose possible to manage pain 20 Yes Lamona Comfort D Pendl, DO   ciprofloxacin (CIPRO) 500 MG tablet Take 1 tablet by mouth 2 times daily for 7 days 9/8/20 9/15/20 Yes Martin D Pendl, DO   metroNIDAZOLE (FLAGYL) 500 MG tablet Take 1 tablet by mouth 3 times daily for 10 days 20 Yes Lamona Comfort D Pendl, DO   omeprazole (PRILOSEC) 20 MG delayed release capsule Take 1 capsule by mouth Daily for 20 days 20 Yes Martin D Pendl, DO   ondansetron (ZOFRAN-ODT) 4 MG disintegrating tablet Take 1 tablet by mouth every 8 hours as needed for Nausea or Vomiting 20  Yes Hazle Nails Pendl, DO       Current medications:    Current Facility-Administered Medications   Medication Dose Route Frequency Provider Last Rate Last Dose    HYDROmorphone (DILAUDID) injection 0.25 mg  0.25 mg Intravenous Q5 Min PRN Natty Aguayo MD        fentaNYL (SUBLIMAZE) injection 50 mcg  50 mcg Intravenous Q5 Min PRN Natty Aguayo MD        HYDROmorphone (DILAUDID) injection 0.25 mg  0.25 mg Intravenous Q5 Min PRN Natty Aguayo MD        HYDROmorphone (DILAUDID) injection 0.5 mg  0.5 mg Intravenous Q5 Min PRN Natty Aguayo MD        oxyCODONE (ROXICODONE) immediate release tablet 5 mg  5 mg Oral PRN Natty Aguayo MD        Or    oxyCODONE (ROXICODONE) immediate release tablet 10 mg  10 mg Oral PRN Scot Smith Shelbie Boyce MD        diphenhydrAMINE (BENADRYL) injection 12.5 mg  12.5 mg Intravenous Once PRN Jaime Figueroa MD        promethazine [facility]) injection 6.25 mg  6.25 mg Intravenous PRN Jaime Figueroa MD        labetalol (NORMODYNE;TRANDATE) injection 5 mg  5 mg Intravenous Q10 Min PRN Jaime Figueroa MD        meperidine (DEMEROL) injection 12.5 mg  12.5 mg Intravenous Q5 Min PRN Jaime Figueroa MD        morphine injection 4 mg  4 mg Intravenous Q3H PRN Liz Marmolejo MD   4 mg at 09/11/20 0437    ciprofloxacin (CIPRO) IVPB 400 mg  400 mg Intravenous Q12H Liz Marmolejo MD   Stopped at 09/10/20 2255    metronidazole (FLAGYL) 500 mg in NaCl 100 mL IVPB premix  500 mg Intravenous Richard Rizo MD   Stopped at 09/11/20 0540    ondansetron (ZOFRAN) injection 4 mg  4 mg Intravenous Q30 Min PRN Liz Marmolejo MD   4 mg at 09/09/20 1455    HYDROcodone-acetaminophen ([facility]) 5-325 MG per tablet 1 tablet  1 tablet Oral Q4H PRN Liz Marmolejo MD        pantoprazole (PROTONIX) tablet 40 mg  40 mg Oral QAM AC Liz Marmolejo MD        sodium chloride flush 0.9 % injection 10 mL  10 mL Intravenous 2 times per day Liz Marmolejo MD   10 mL at 09/10/20 2033    sodium chloride flush 0.9 % injection 10 mL  10 mL Intravenous PRN Liz Marmolejo MD        acetaminophen (TYLENOL) tablet 650 mg  650 mg Oral Q6H PRN Liz Marmolejo MD        Or   Gladystine Mower acetaminophen (TYLENOL) suppository 650 mg  650 mg Rectal Q6H PRN Liz Marmolejo MD        polyethylene glycol (GLYCOLAX) packet 17 g  17 g Oral Daily PRN Liz Marmolejo MD        promethazine (PHENERGAN) tablet 12.5 mg  12.5 mg Oral Q6H PRN Liz Marmolejo MD        Or    [facility]dansetron [facility]) injection 4 mg  4 mg Intravenous Q6H PRN Liz Marmolejo MD   4 mg at 09/11/20 0437    enoxaparin (LOVENOX) injection 40 mg  40 mg Subcutaneous Nightly Liz Marmolejo MD   40 mg at 09/09/20 3267       Allergies:     Allergies   Allergen Reactions    Ibuprofen Anaphylaxis     \"kidney failure at age 27\"       Problem List:    Patient Active Problem List   Diagnosis Code    Abdominal pain R10.9    Cecum mass K63.89       Past Medical History:        Diagnosis Date    Anxiety     Chronic back pain greater than 3 months duration     Depression        Past Surgical History:        Procedure Laterality Date     SECTION      COLONOSCOPY N/A 9/10/2020    COLONOSCOPY WITH BIOPSY ILEO-CECAL VALVE performed by Celestina Boas, MD at [address]  9/10/2020    COLONOSCOPY POLYPECTOMY SNARE/COLD OF SIGMOID COLON POLYP performed by Celestina Boas, MD at [address] [facility] History:    Social History     Tobacco Use    Smoking status: Current Some Day Smoker     Packs/day: 0.50     Years: 10.00     Pack years: 5.00     Types: Cigarettes     Last attempt to quit: 3/15/2010     Years since quitting: 10.5    Smokeless tobacco: Current User   Substance Use Topics    Alcohol use: Yes     Comment: 12 pack on weekends                                Ready to quit: Not Answered  Counseling given: Not Answered      Vital Signs (Current):   Vitals:    09/10/20 1238 09/10/20 2026 20 0037 20 0400   BP: 108/65 120/69 114/76 117/76   Pulse: 53 64 62 53   Resp: 16 16 18 18   Temp: 98 °F (36.7 °C) 98.1 °F (36.7 °C) 98.1 °F (36.7 °C) 98 °F (36.7 °C)   TempSrc: Oral Oral Oral Oral   SpO2: 99% 98% 99% 99%   Weight:       Height:                                                  BP Readings from Last 3 Encounters:   20 117/76   09/10/20 (!) 96/47   20 (!) 149/78       NPO Status: Time of last liquid consumption: 2300                        Time of last solid consumption: 1500(tomato soup )                        Date of last liquid consumption: 20                        Date of last solid food consumption: 20    BMI:   Wt Readings from Last 3 Encounters:   20 186 lb (84.4 kg)   20 186 lb (84.4 kg)   20 195 lb 1 oz (88.5 kg)     Body mass index is 9/11/2020

## 2020-09-11 NOTE — PROGRESS NOTES
Pt arrived from OR to PACU, awakens to voice, c/o pain in abdomen. VSS, O2 sats 100% on 6 L simple mask. Incisions on abdomen dry and intact, abdomen soft. Herrera in place draining clear yellow urine. Will monitor and treat pain per MAR.

## 2020-09-11 NOTE — PROGRESS NOTES
Pt resting quietly in bed with eyes closed, awakens easily. Pt states pain is tolerable for her, rates pain as a 6 out of 10. VSS, O2 sats 93% on room air. Incisions on abdomen dry and intact, abdomen soft. Ice pack in place. Herrera remains in place draining clear yellow urine. Pt seen by anesthesia, phase 1 criteria met. Will transfer pt to .

## 2020-09-11 NOTE — ANESTHESIA POSTPROCEDURE EVALUATION
Department of Anesthesiology  Postprocedure Note    Patient: Lex Morgan  MRN: [id number]  YOB: 1985  Date of evaluation: 9/11/2020  Time:  2:59 PM     Procedure Summary     Date:  09/11/20 Room / Location:  [address]    Anesthesia Start:  1125 Anesthesia Stop:  6669    Procedure:  RIGHT BOWEL RESECTION HEMICOLECTOMY LAPAROSCOPIC (Right ) Diagnosis:  (CECAL MASS)    Surgeon:  Julienne Garcia MD Responsible Provider:  Percy Mckenna MD    Anesthesia Type:  general ASA Status:  2          Anesthesia Type: general    Lindy Phase I: Lindy Score: 10    Lindy Phase II:      Last vitals: Reviewed and per EMR flowsheets.        Anesthesia Post Evaluation    Level of consciousness: awake  Complications: no

## 2020-09-11 NOTE — BRIEF OP NOTE
Brief Postoperative Note      Patient: Concepción Navarrete  YOB: 1985  MRN: [id number]    Date of Procedure: 9/11/2020    Pre-Op Diagnosis: CECAL MASS    Post-Op Diagnosis: Same       Procedure- laparoscopic right hemicolectomy    Surgeon(s):  Edie Charles MD    Assistant:  Surgical Assistant: Tanya Frankel, RN; Ludy Moctezuma RN    Anesthesia: General    Estimated Blood Loss (mL): Minimal    Complications: None    Specimens:   ID Type Source Tests Collected by Time Destination   A : A) RIGHT COLON Tissue Tissue SURGICAL PATHOLOGY Edie Charles MD 9/11/2020 1236        Implants:  * No implants in log *      Drains:   Urethral Catheter Non-latex;Straight-tip 16 fr (Active)       Findings: firm mass in cecum most consistent with ulcerated colon cancer    Electronically signed by Edie Charles MD on 9/11/2020 at 1:07 PM

## 2020-09-11 NOTE — PROGRESS NOTES
[facility]  Gastroenterology Progress Note    Migue Betts is a 30 y.o. female patient. 1. Cecum mass    2. Liver lesion    3. Diarrhea, unspecified type        SUBJECTIVE:  Pt seen postop. Having some abdominal pain. ROS:  Cardiovascular ROS: no chest pain or dyspnea on exertion  Gastrointestinal ROS: no N/V  Respiratory ROS: no cough, shortness of breath, or wheezing    Physical    VITALS:  /70   Pulse 60   Temp 97.3 °F (36.3 °C) (Temporal)   Resp 14   Ht 5' 9\" (1.753 m)   Wt 186 lb (84.4 kg)   SpO2 100%   BMI 27.47 kg/m²   TEMPERATURE:  Current - Temp: 97.3 °F (36.3 °C); Max - Temp  Av.7 °F (35.9 °C)  Min: 96.1 °F (35.6 °C)  Max: 98.1 °F (36.7 °C)    NAD  RRR, Nl s1s2  Lungs CTA Bilaterally, normal effort  Abdomen soft, ND, appropriately tender postop, no HSM, Bowel sounds normal  AAOx3, No asterixis     Data    Data Review:    Recent Labs     20  1202 09/10/20  0642   WBC 12.0* 9.5   HGB 14.3 14.1   HCT 43.1 41.8   MCV 96.8 97.2    273     Recent Labs     20  1202 09/10/20  0642    142   K 4.0 4.4    109   CO2 22 23   BUN 5* 5*   CREATININE 0.8 1.0     Recent Labs     20  1202   AST 20   ALT 16   BILITOT 0.5   ALKPHOS 72     Recent Labs     20  1202   LIPASE 25.0     No results for input(s): PROTIME, INR in the last 72 hours. No results for input(s): PTT in the last 72 hours. ASSESSMENT     Cecal mass - pt presented with abdominal pain, N/V/D. CT showed masslike area at the ICV and cecum. Colonoscopy 9/10 with friable ulcerated mass involving the ICV and cecum, unable to intubate the ileum. Path with at least high grade dysplasia. S/p right hemicolectomy today. Liver lesion - indeterminate on CT. PLAN    - post op care per surgery  - f/u path  - at some point will need MRI liver to characterize liver lesion.     Discussed with Dr. Avril John, [address]

## 2020-09-11 NOTE — PROGRESS NOTES
AM assessment complete. Neuro checks WDL. VSS. Patient is resting in bed with call light in reach. Patient is independent. All questions and concerns addressed at this time.

## 2020-09-12 LAB
A/G RATIO: 1.6 (ref 1.1–2.2)
ALBUMIN SERPL-MCNC: 4.1 G/DL (ref 3.4–5)
ALP BLD-CCNC: 53 U/L (ref 40–129)
ALT SERPL-CCNC: 14 U/L (ref 10–40)
ANION GAP SERPL CALCULATED.3IONS-SCNC: 10 MMOL/L (ref 3–16)
AST SERPL-CCNC: 21 U/L (ref 15–37)
BILIRUB SERPL-MCNC: 0.4 MG/DL (ref 0–1)
BUN BLDV-MCNC: 5 MG/DL (ref 7–20)
CALCIUM SERPL-MCNC: 9.2 MG/DL (ref 8.3–10.6)
CHLORIDE BLD-SCNC: 106 MMOL/L (ref 99–110)
CO2: 23 MMOL/L (ref 21–32)
CREAT SERPL-MCNC: 0.7 MG/DL (ref 0.6–1.1)
GFR AFRICAN AMERICAN: >60
GFR NON-AFRICAN AMERICAN: >60
GLOBULIN: 2.5 G/DL
GLUCOSE BLD-MCNC: 93 MG/DL (ref 70–99)
HCT VFR BLD CALC: 38.8 % (ref 36–48)
HEMOGLOBIN: 13.1 G/DL (ref 12–16)
MCH RBC QN AUTO: 32.7 PG (ref 26–34)
MCHC RBC AUTO-ENTMCNC: 33.7 G/DL (ref 31–36)
MCV RBC AUTO: 96.8 FL (ref 80–100)
PDW BLD-RTO: 14 % (ref 12.4–15.4)
PLATELET # BLD: 267 K/UL (ref 135–450)
PMV BLD AUTO: 8.7 FL (ref 5–10.5)
POTASSIUM REFLEX MAGNESIUM: 3.9 MMOL/L (ref 3.5–5.1)
RBC # BLD: 4.01 M/UL (ref 4–5.2)
SODIUM BLD-SCNC: 139 MMOL/L (ref 136–145)
TOTAL PROTEIN: 6.6 G/DL (ref 6.4–8.2)
WBC # BLD: 15 K/UL (ref 4–11)

## 2020-09-12 PROCEDURE — APPSS15 APP SPLIT SHARED TIME 0-15 MINUTES: Performed by: NURSE PRACTITIONER

## 2020-09-12 PROCEDURE — 80053 COMPREHEN METABOLIC PANEL: CPT

## 2020-09-12 PROCEDURE — 1200000000 HC SEMI PRIVATE

## 2020-09-12 PROCEDURE — 2580000003 HC RX 258: Performed by: NURSE PRACTITIONER

## 2020-09-12 PROCEDURE — 85027 COMPLETE CBC AUTOMATED: CPT

## 2020-09-12 PROCEDURE — 6360000002 HC RX W HCPCS: Performed by: SURGERY

## 2020-09-12 PROCEDURE — APPNB30 APP NON BILLABLE TIME 0-30 MINS: Performed by: NURSE PRACTITIONER

## 2020-09-12 PROCEDURE — 6370000000 HC RX 637 (ALT 250 FOR IP): Performed by: SURGERY

## 2020-09-12 PROCEDURE — 99024 POSTOP FOLLOW-UP VISIT: CPT | Performed by: SURGERY

## 2020-09-12 PROCEDURE — 36415 COLL VENOUS BLD VENIPUNCTURE: CPT

## 2020-09-12 PROCEDURE — 2580000003 HC RX 258: Performed by: SURGERY

## 2020-09-12 RX ADMIN — HYDROMORPHONE HYDROCHLORIDE 1 MG: 1 INJECTION, SOLUTION INTRAMUSCULAR; INTRAVENOUS; SUBCUTANEOUS at 21:06

## 2020-09-12 RX ADMIN — HYDROMORPHONE HYDROCHLORIDE 1 MG: 1 INJECTION, SOLUTION INTRAMUSCULAR; INTRAVENOUS; SUBCUTANEOUS at 04:17

## 2020-09-12 RX ADMIN — HYDROMORPHONE HYDROCHLORIDE 1 MG: 1 INJECTION, SOLUTION INTRAMUSCULAR; INTRAVENOUS; SUBCUTANEOUS at 06:19

## 2020-09-12 RX ADMIN — HYDROMORPHONE HYDROCHLORIDE 1 MG: 1 INJECTION, SOLUTION INTRAMUSCULAR; INTRAVENOUS; SUBCUTANEOUS at 16:58

## 2020-09-12 RX ADMIN — HYDROMORPHONE HYDROCHLORIDE 1 MG: 1 INJECTION, SOLUTION INTRAMUSCULAR; INTRAVENOUS; SUBCUTANEOUS at 02:16

## 2020-09-12 RX ADMIN — HYDROMORPHONE HYDROCHLORIDE 1 MG: 1 INJECTION, SOLUTION INTRAMUSCULAR; INTRAVENOUS; SUBCUTANEOUS at 00:10

## 2020-09-12 RX ADMIN — HYDROMORPHONE HYDROCHLORIDE 1 MG: 1 INJECTION, SOLUTION INTRAMUSCULAR; INTRAVENOUS; SUBCUTANEOUS at 08:26

## 2020-09-12 RX ADMIN — ENOXAPARIN SODIUM 40 MG: 40 INJECTION SUBCUTANEOUS at 20:52

## 2020-09-12 RX ADMIN — HYDROMORPHONE HYDROCHLORIDE 1 MG: 1 INJECTION, SOLUTION INTRAMUSCULAR; INTRAVENOUS; SUBCUTANEOUS at 23:09

## 2020-09-12 RX ADMIN — HYDROMORPHONE HYDROCHLORIDE 1 MG: 1 INJECTION, SOLUTION INTRAMUSCULAR; INTRAVENOUS; SUBCUTANEOUS at 14:53

## 2020-09-12 RX ADMIN — PANTOPRAZOLE SODIUM 40 MG: 40 TABLET, DELAYED RELEASE ORAL at 06:20

## 2020-09-12 RX ADMIN — HYDROMORPHONE HYDROCHLORIDE 1 MG: 1 INJECTION, SOLUTION INTRAMUSCULAR; INTRAVENOUS; SUBCUTANEOUS at 12:48

## 2020-09-12 RX ADMIN — HYDROMORPHONE HYDROCHLORIDE 1 MG: 1 INJECTION, SOLUTION INTRAMUSCULAR; INTRAVENOUS; SUBCUTANEOUS at 18:58

## 2020-09-12 RX ADMIN — Medication 10 ML: at 20:53

## 2020-09-12 RX ADMIN — SODIUM CHLORIDE: 9 INJECTION, SOLUTION INTRAVENOUS at 20:52

## 2020-09-12 RX ADMIN — HYDROMORPHONE HYDROCHLORIDE 1 MG: 1 INJECTION, SOLUTION INTRAMUSCULAR; INTRAVENOUS; SUBCUTANEOUS at 10:38

## 2020-09-12 ASSESSMENT — PAIN SCALES - GENERAL
PAINLEVEL_OUTOF10: 9
PAINLEVEL_OUTOF10: 8
PAINLEVEL_OUTOF10: 9
PAINLEVEL_OUTOF10: 7
PAINLEVEL_OUTOF10: 8
PAINLEVEL_OUTOF10: 7
PAINLEVEL_OUTOF10: 6
PAINLEVEL_OUTOF10: 8
PAINLEVEL_OUTOF10: 9
PAINLEVEL_OUTOF10: 8
PAINLEVEL_OUTOF10: 9
PAINLEVEL_OUTOF10: 8
PAINLEVEL_OUTOF10: 5
PAINLEVEL_OUTOF10: 10
PAINLEVEL_OUTOF10: 8
PAINLEVEL_OUTOF10: 6
PAINLEVEL_OUTOF10: 8
PAINLEVEL_OUTOF10: 6

## 2020-09-12 ASSESSMENT — PAIN DESCRIPTION - LOCATION
LOCATION: ABDOMEN

## 2020-09-12 ASSESSMENT — PAIN DESCRIPTION - PAIN TYPE
TYPE: SURGICAL PAIN

## 2020-09-12 ASSESSMENT — PAIN DESCRIPTION - PROGRESSION: CLINICAL_PROGRESSION: GRADUALLY IMPROVING

## 2020-09-12 NOTE — PROGRESS NOTES
2018: Assessment complete, VSS, BS hypoactive, pt denies passing flatus, tolerating ice chips, ambulating the unit several times, voiding post catheter removal w/o difficulty, surgical abd lap sites well approximated, no redness or drainage noted, ice applied to surgical site, discussed care plan, pt mutually agrees, call light and belongings in reach, will continue monitoring. 2207: dilaudid given for continue abd pain, see MAR.    0010: dilaudid given for abd pain, pt c/o pain RUQ, ambulating in the hallway to try to pass flatus. 0216: gave dilaudid for abd pain, ice applied to abd, heating pad to lower back, instructed pt to lay towards L side for bowel motility. 0600: pt ambulating in the hallway, still having a lot of gas pain and surgical pain, will give pain med when due.   Doretha Donald

## 2020-09-12 NOTE — PLAN OF CARE
Problem: Activity:  Goal: Risk for activity intolerance will decrease  Description: Risk for activity intolerance will decrease  9/12/2020 1920 by Melinda Brooks RN  Outcome: Ongoing  9/12/2020 0848 by Teresa Augustin RN  Outcome: Ongoing     Problem:  Bowel/Gastric:  Goal: Bowel function will improve  Description: Bowel function will improve  9/12/2020 1920 by Melinda Brooks RN  Outcome: Ongoing  9/12/2020 0848 by Teresa Augustin RN  Outcome: Ongoing  Goal: Diagnostic test results will improve  Description: Diagnostic test results will improve  9/12/2020 1920 by Melinda Brooks RN  Outcome: Ongoing  9/12/2020 0848 by Teresa Augustin RN  Outcome: Ongoing  Goal: Occurrences of nausea will decrease  Description: Occurrences of nausea will decrease  9/12/2020 1920 by Melinda Brooks RN  Outcome: Ongoing  9/12/2020 0848 by Teresa Augustin RN  Outcome: Ongoing  Goal: Occurrences of vomiting will decrease  Description: Occurrences of vomiting will decrease  9/12/2020 1920 by Melinda Brooks RN  Outcome: Ongoing  9/12/2020 0848 by Teresa Augustin RN  Outcome: Ongoing     Problem: Fluid Volume:  Goal: Maintenance of adequate hydration will improve  Description: Maintenance of adequate hydration will improve  9/12/2020 1920 by Melinda Brooks RN  Outcome: Ongoing  9/12/2020 0848 by Teresa Augustin RN  Outcome: Ongoing     Problem: Health Behavior:  Goal: Ability to state signs and symptoms to report to health care provider will improve  Description: Ability to state signs and symptoms to report to health care provider will improve  9/12/2020 1920 by Melinda Brooks RN  Outcome: Ongoing  9/12/2020 0848 by Teresa Augustin RN  Outcome: Ongoing     Problem: Physical Regulation:  Goal: Complications related to the disease process, condition or treatment will be avoided or minimized  Description: Complications related to the disease process, condition or treatment will be avoided or minimized  9/12/2020 1920 by Melinda Brooks RN  Outcome: Ongoing  9/12/2020 0848 by Sal Brice RN  Outcome: Ongoing  Goal: Ability to maintain clinical measurements within normal limits will improve  Description: Ability to maintain clinical measurements within normal limits will improve  9/12/2020 1920 by Alena Ruvalcaba RN  Outcome: Ongoing  9/12/2020 0848 by Sal Brice RN  Outcome: Ongoing     Problem: Sensory:  Goal: Ability to identify factors that increase the pain will improve  Description: Ability to identify factors that increase the pain will improve  9/12/2020 1920 by Alena Ruvalcaba RN  Outcome: Ongoing  9/12/2020 0848 by Sal Brice RN  Outcome: Ongoing  Goal: Ability to notify healthcare provider of pain before it becomes unmanageable or unbearable will improve  Description: Ability to notify healthcare provider of pain before it becomes unmanageable or unbearable will improve  9/12/2020 1920 by Alena Ruvalcaba RN  Outcome: Ongoing  9/12/2020 0848 by Sal Brice RN  Outcome: Ongoing  Goal: Pain level will decrease  Description: Pain level will decrease  9/12/2020 1920 by Alena Ruvalcaba RN  Outcome: Ongoing  9/12/2020 0848 by Sal Brice RN  Outcome: Ongoing     Problem: Anxiety:  Goal: Level of anxiety will decrease  Description: Level of anxiety will decrease  9/12/2020 1920 by Alena Ruvalcaba RN  Outcome: Ongoing  9/12/2020 0848 by Sal Brice RN  Outcome: Ongoing     Problem: Pain:  Goal: Control of acute pain  Description: Control of acute pain  9/12/2020 1920 by Alena Ruvalcaba RN  Outcome: Ongoing  9/12/2020 0848 by Sal Brice RN  Outcome: Ongoing

## 2020-09-12 NOTE — PROGRESS NOTES
[facility]        Progress Note    Patient Name: Serjio Alvarez  MRN: [id number]  YOB: 1985  Date of Evaluation: 2020    Subjective:  No acute events overnight  Pain controlled, mostly \"gas\" pain  Only nausea associated with narcotic administration, no vomiting  No flatus or BM, bloated  Ambulating frequently, tolerates well      Post-Operative Day #1    Vital Signs:  Patient Vitals for the past 24 hrs:   BP Temp Temp src Pulse Resp SpO2   20 1149 126/79 -- -- 62 12 --   20 0815 123/76 97.8 °F (36.6 °C) Oral 70 18 98 %   20 0407 124/88 98.3 °F (36.8 °C) Oral 66 16 96 %   20 0000 129/72 98.2 °F (36.8 °C) Oral -- 17 --   20 124/72 97.7 °F (36.5 °C) Oral 53 16 --   20 1510 121/75 97.9 °F (36.6 °C) Oral 53 16 96 %   20 1445 118/66 -- -- 61 13 96 %   20 1430 126/61 -- -- 64 14 97 %   20 1415 124/70 -- -- 60 14 100 %   20 1400 (!) 133/57 -- -- 62 14 100 %   20 1350 126/80 97.3 °F (36.3 °C) Temporal 63 14 100 %   20 1345 (!) 142/90 -- -- 62 12 100 %   20 1340 123/87 -- -- 65 14 100 %   20 1336 138/79 97.6 °F (36.4 °C) Temporal 68 14 100 %      TEMPERATURE HISTORY 24H: Temp (24hrs), Av.8 °F (36 °C), Min:96.1 °F (35.6 °C), Max:98.3 °F (36.8 °C)    BLOOD PRESSURE HISTORY: Systolic (94PXR), XKY:232 , Min:85 , RJO:233    Diastolic (93PQS), UMV:16, Min:42, Max:96      Intake/Output:  I/O last 3 completed shifts: In:  [P.O.:100; I.V.:2871]  Out:  [Urine:2000; Blood:50]  No intake/output data recorded.   Drain/tube Output:       Physical Exam:  General: awake, alert, oriented to  person, place, time  Lungs: unlabored respirations  Abdomen: soft, mildly distended, incisional tenderness only, bowel sounds present   Skin/Wound: healing well, no drainage, no erythema, well approximated    Labs:  CBC:    Recent Labs     20  1202 09/10/20  0642 20  0659   WBC 12.0* 9.5 15.0*   HGB 14.3 14.1 13.1   HCT 43.1 41.8 38.8    273 267     BMP:    Recent Labs     09/09/20  1202 09/10/20  0642 09/12/20  0659    142 139   K 4.0 4.4 3.9    109 106   CO2 22 23 23   BUN 5* 5* 5*   CREATININE 0.8 1.0 0.7   GLUCOSE 106* 88 93     Hepatic:    Recent Labs     09/09/20  1202 09/12/20  0659   AST 20 21   ALT 16 14   BILITOT 0.5 0.4   ALKPHOS 72 53     Amylase:  No results found for: AMYLASE  Lipase:    Lab Results   Component Value Date    LIPASE 25.0 09/09/2020    LIPASE 22.0 09/08/2020      Mag:  No results found for: MG  Phos:   No results found for: PHOS   Coags: No results found for: PROTIME, INR, APTT    Cultures:  Anaerobic culture  No results found for: LABANAE  Fungus stain  No results found for requested labs within last 30 days. Gram stain  No results found for requested labs within last 30 days. Organism  No results found for: [facility]  Surgical culture  No results found for: CXSURG  Blood culture 1  No results found for requested labs within last 30 days. Blood culture 2  No results found for requested labs within last 30 days. Fecal occult  No results found for requested labs within last 30 days. GI bacterial pathogens by PCR  No results found for requested labs within last 30 days. C. difficile  No results found for requested labs within last 30 days. Urine culture  Lab Results   Component Value Date    LABURIN  09/08/2020     <10,000 CFU/ml mixed skin/urogenital rory. No further workup       Pathology:  Pathology results pending     Imaging:  I have personally reviewed the following films:    No results found.     Scheduled Meds:   enoxaparin  40 mg Subcutaneous Nightly    pantoprazole  40 mg Oral QAM AC    sodium chloride flush  10 mL Intravenous 2 times per day     Continuous Infusions:   sodium chloride 125 mL/hr at 09/11/20 1413     PRN Meds:.ondansetron, HYDROmorphone, sodium chloride flush, acetaminophen **OR** acetaminophen, polyethylene glycol, promethazine **OR** [DISCONTINUED] ondansetron      Assessment:  30 y.o. female admitted with   1. Cecum mass    2. Liver lesion    3. Diarrhea, unspecified type        Status-post laparoscopic right hemicolectomy on 9/11/2020 for cecal mass, OR pathology pending (colonoscopy pathology with, at least, high grade dysplasia)  Liver lesion      Plan:  1. Continue NPO, monitor bowel function  2. IV hydration; monitor and correct electrolytes  3. Activity as tolerated, ambulate TID  4. Pulmonary toilet, incentive spirometry  5. PRN analgesics and antiemetics--minimizing narcotics as tolerated  6. DVT prophylaxis with Lovenox and SCD's  7. Management of medical comorbid etiologies per primary team and consulting services    EDUCATION:  Educated patient on plan of care and disease process--all questions answered. Plans discussed with patient and nursing. Reviewed and discussed with Dr. Stephanie Nassar.       Signed:  VIV Mallory - CNP  9/12/2020 11:57 AM      Surg Staff;   Pt seen and examined with NP See full note above  Pt is a little bloated and tympanitic, partially obstructed preop, wait on PO today  Continue IVF  Path pending  Doing well with walking etc    John Walker

## 2020-09-12 NOTE — PROGRESS NOTES
AM assessment complete. Abdominal lap sites remain approximated with surgical glue, s/s free of infection at this time, will monitor. Pt c/o 8/10 pain, pain meds per orders, please see MAR, will monitor. Pt states she is not passing flatus at this time, bowel sounds hypoactive, encouraged frequent ambulation. The care plan and education has been reviewed and mutually agreed upon with the patient. Pt denies any questions or concerns at this time, call light w/in reach.  Dee Benito RN

## 2020-09-12 NOTE — PLAN OF CARE
Problem: Activity:  Goal: Risk for activity intolerance will decrease  Description: Risk for activity intolerance will decrease  9/12/2020 0848 by Sarabjit Davis RN  Outcome: Ongoing  9/12/2020 0115 by Jose Burch RN  Outcome: Ongoing     Problem:  Bowel/Gastric:  Goal: Bowel function will improve  Description: Bowel function will improve  9/12/2020 0848 by Sarabjit Davis RN  Outcome: Ongoing  9/12/2020 0115 by Jose Burch RN  Outcome: Ongoing  Goal: Diagnostic test results will improve  Description: Diagnostic test results will improve  9/12/2020 0848 by Sarabjit Davis RN  Outcome: Ongoing  9/12/2020 0115 by Jose Burch RN  Outcome: Ongoing  Goal: Occurrences of nausea will decrease  Description: Occurrences of nausea will decrease  9/12/2020 0848 by Sarabjit Davis RN  Outcome: Ongoing  9/12/2020 0115 by Jose Burch RN  Outcome: Ongoing  Goal: Occurrences of vomiting will decrease  Description: Occurrences of vomiting will decrease  9/12/2020 0848 by Sarabjit Davis RN  Outcome: Ongoing  9/12/2020 0115 by Jose Burch RN  Outcome: Ongoing     Problem: Fluid Volume:  Goal: Maintenance of adequate hydration will improve  Description: Maintenance of adequate hydration will improve  9/12/2020 0848 by Sarabjit Davis RN  Outcome: Ongoing  9/12/2020 0115 by Jose Burch RN  Outcome: Ongoing     Problem: Health Behavior:  Goal: Ability to state signs and symptoms to report to health care provider will improve  Description: Ability to state signs and symptoms to report to health care provider will improve  9/12/2020 0848 by Sarabjit Davis RN  Outcome: Ongoing  9/12/2020 0115 by Jose Burch RN  Outcome: Ongoing     Problem: Physical Regulation:  Goal: Complications related to the disease process, condition or treatment will be avoided or minimized  Description: Complications related to the disease process, condition or treatment will be avoided or minimized  9/12/2020 0848 by Tam Cota RN  Outcome: Ongoing  9/12/2020 0115 by Gaby Coelho RN  Outcome: Ongoing  Goal: Ability to maintain clinical measurements within normal limits will improve  Description: Ability to maintain clinical measurements within normal limits will improve  9/12/2020 0848 by Tam Cota RN  Outcome: Ongoing  9/12/2020 0115 by Gaby Coelho RN  Outcome: Ongoing     Problem: Sensory:  Goal: Ability to identify factors that increase the pain will improve  Description: Ability to identify factors that increase the pain will improve  9/12/2020 0848 by Tam Cota RN  Outcome: Ongoing  9/12/2020 0115 by Gaby Coelho RN  Outcome: Ongoing  Goal: Ability to notify healthcare provider of pain before it becomes unmanageable or unbearable will improve  Description: Ability to notify healthcare provider of pain before it becomes unmanageable or unbearable will improve  9/12/2020 0848 by Tam Cota RN  Outcome: Ongoing  9/12/2020 0115 by Gaby Coelho RN  Outcome: Ongoing  Goal: Pain level will decrease  Description: Pain level will decrease  9/12/2020 0848 by Tam Cota RN  Outcome: Ongoing  9/12/2020 0115 by Gaby Coelho RN  Outcome: Ongoing     Problem: Anxiety:  Goal: Level of anxiety will decrease  Description: Level of anxiety will decrease  9/12/2020 0848 by Tam Cota RN  Outcome: Ongoing  9/12/2020 0115 by Gaby Coelho RN  Outcome: Ongoing     Problem: Pain:  Goal: Control of acute pain  Description: Control of acute pain  9/12/2020 0848 by Tam Cota RN  Outcome: Ongoing  9/12/2020 0115 by Gaby Coelho RN  Outcome: Ongoing

## 2020-09-12 NOTE — PLAN OF CARE
Problem: Activity:  Goal: Risk for activity intolerance will decrease  Description: Risk for activity intolerance will decrease  Outcome: Ongoing     Problem:  Bowel/Gastric:  Goal: Bowel function will improve  Description: Bowel function will improve  Outcome: Ongoing  Goal: Diagnostic test results will improve  Description: Diagnostic test results will improve  Outcome: Ongoing  Goal: Occurrences of nausea will decrease  Description: Occurrences of nausea will decrease  Outcome: Ongoing  Goal: Occurrences of vomiting will decrease  Description: Occurrences of vomiting will decrease  Outcome: Ongoing     Problem: Fluid Volume:  Goal: Maintenance of adequate hydration will improve  Description: Maintenance of adequate hydration will improve  Outcome: Ongoing     Problem: Health Behavior:  Goal: Ability to state signs and symptoms to report to health care provider will improve  Description: Ability to state signs and symptoms to report to health care provider will improve  Outcome: Ongoing     Problem: Physical Regulation:  Goal: Complications related to the disease process, condition or treatment will be avoided or minimized  Description: Complications related to the disease process, condition or treatment will be avoided or minimized  Outcome: Ongoing  Goal: Ability to maintain clinical measurements within normal limits will improve  Description: Ability to maintain clinical measurements within normal limits will improve  Outcome: Ongoing     Problem: Sensory:  Goal: Ability to identify factors that increase the pain will improve  Description: Ability to identify factors that increase the pain will improve  Outcome: Ongoing  Goal: Ability to notify healthcare provider of pain before it becomes unmanageable or unbearable will improve  Description: Ability to notify healthcare provider of pain before it becomes unmanageable or unbearable will improve  Outcome: Ongoing  Goal: Pain level will decrease  Description:

## 2020-09-12 NOTE — PROGRESS NOTES
[facility] PROGRESS NOTE    9/12/2020 1:46 PM        Name: Ashley Cardona . Admitted: 9/9/2020  Primary Care Provider: Zoya Sifuentes MD (Tel: [phone])      Subjective:  Patient is a 29 yo female with hx anxiety/depression. She presented to ER with c/o abdominal pain which has been off and on for the past 5 months but worse over the past few days. Associated with n/v/d. Evaluated in ER on Tuesday. CT scan showed questionable mass vs inflammation and she was discharged home on Cipro and Flagyl. The pain worsened prompting return to hospital.     9/11/2020: laparoscopic right hemicolectomy    Has been up walking in halls. Doing well, operative pain adequately controlled. Offers complaints of intermittent gas pains. No flatus or BM, says she has burped a few times. Remains NPO except for ice. Denies shortness of breath.     Reviewed interval ancillary notes    Current Medications  enoxaparin (LOVENOX) injection 40 mg, Nightly  ondansetron (ZOFRAN) injection 4 mg, Q6H PRN  HYDROmorphone (DILAUDID) injection 1 mg, Q2H PRN  0.9 % sodium chloride infusion, Continuous  pantoprazole (PROTONIX) tablet 40 mg, QAM AC  sodium chloride flush 0.9 % injection 10 mL, 2 times per day  sodium chloride flush 0.9 % injection 10 mL, PRN  acetaminophen (TYLENOL) tablet 650 mg, Q6H PRN    Or  acetaminophen (TYLENOL) suppository 650 mg, Q6H PRN  polyethylene glycol (GLYCOLAX) packet 17 g, Daily PRN  promethazine (PHENERGAN) tablet 12.5 mg, Q6H PRN        Objective:  /79   Pulse 62   Temp 97.8 °F (36.6 °C) (Oral)   Resp 12   Ht 5' 9\" (1.753 m)   Wt 186 lb (84.4 kg)   SpO2 98%   BMI 27.47 kg/m²     Intake/Output Summary (Last 24 hours) at 9/12/2020 1346  Last data filed at 9/12/2020 0012  Gross per 24 hour   Intake 1371 ml   Output 1600 ml   Net -229 ml      Wt Readings from Last 3 Encounters:   09/09/20 186 lb (84.4 kg) 09/08/20 186 lb (84.4 kg)   01/06/20 195 lb 1 oz (88.5 kg)     General:  Awake, alert, oriented in NAD  Skin:  Warm and dry. No unusual bruising or rash  Neck:  Supple. No JVD appreciated  Chest:  Normal effort. Clear to auscultation  Cardiovascular:  RRR, normal S1/S2, no murmur/gallop/rub  Abdomen:  Soft, mild operative tenderness, +bowel sounds  Extremities:  No edema  Neurological: No focal deficits  Psychological: Normal mood and affect      Labs and Tests:  CBC:   Recent Labs     09/10/20  0642 09/12/20  0659   WBC 9.5 15.0*   HGB 14.1 13.1    267     BMP:    Recent Labs     09/10/20  0642 09/12/20 0659    139   K 4.4 3.9    106   CO2 23 23   BUN 5* 5*   CREATININE 1.0 0.7   GLUCOSE 88 93     Hepatic:   Recent Labs     09/12/20 0659   AST 21   ALT 14   BILITOT 0.4   ALKPHOS 53       CT Abdomen and Pelvis 9/8/2020:  FINDINGS:    Lower Chest: No evidence of pneumonia or other acute findings.         Organs: There is a low-density lesion in the posterior dome of the liver    measuring 1.8 cm.  Margins appear ill-defined.  Normal appearance of the    gallbladder.  No evidence of pancreatitis.  No ureteral stone or    hydronephrosis. No evidence of pyelonephritis.         GI/Bowel: At the ileocecal valve there is rounded increased density.  This is    not well evaluated due to collapse and lack of contrast.  Adjacent stranding    of fat appears present, and there appears to be mild wall thickening of the    adjacent cecum.  Prominent adjacent lymph nodes are present in the right    lower quadrant measuring up to 1.4 x 0.8 cm, mildly enlarged.  No bowel    obstruction.  Normal appearance of the appendix.         Pelvis: No acute abnormality of the pelvis.  No evidence of cystitis.         Peritoneum/Retroperitoneum: No free air, ascites or abscess.         Bones/Soft Tissues: No fracture or other acute osseous process.              Impression    Masslike area at the ileocecal valve and adjacent cecum.  This may either be    due to malignancy or focal infection.  Adjacent metastatic versus    reactive/inflammatory mildly enlarged lymph nodes.  Colonoscopy recommended.         Indeterminate low-density lesion at the dome of the liver which may be a    metastatic focus. [place] also in the differential diagnosis.  If further    imaging evaluation is needed a pre and post contrast liver mass protocol MRI    examination would be recommended.             Colonoscopy 9/10/2020:  IMPRESSION :  Friable ulcerated mass involving the ICV and deeper into  the cecum. Separate from the append orifice. Unable to  intubate ileum. PLAN :   Await path - rush put on path  surgical consult  CEA  clears until seen by surgery      Problem List  Active Problems:    Abdominal pain    Cecum mass  Resolved Problems:    * No resolved hospital problems. *       Assessment & Plan:   1. Cecal mass. Abnormal CT Scan showing mass like area at ICV and cecum. Colonoscopy 9/10 revealed friable ulcerated mass involving the ICV and deeper into the cecum. Pathology showed high grade dysplasia, adenocarcinoma could not be excluded. She is s/p lap right hemicolectomy 9/11. Presently NPO except ice chips. Continue IV fluids, prn pain meds. CBC and CMP in am. Remains afebrile. 2. Liver lesion. CT scan noted lesion in dome of liver concerning for hemangioma vs mets. Will need MRI to further characterize. 3. Leukocytosis. WBC 15.0. Remains afebrile. Possibly reactive. Patient febrile, lungs clear, no urinary symptoms. Continue to monitor.  CBC in am.       Diet: Diet NPO Effective Now Exceptions are: Ice Chips, Sips with Meds, Popsicles  Code:Full Code  DVT PPX: enoxaparin      VIV Cabrera - CNP   9/12/2020 1:46 PM

## 2020-09-13 LAB
HCT VFR BLD CALC: 36.6 % (ref 36–48)
HEMOGLOBIN: 12.1 G/DL (ref 12–16)
MCH RBC QN AUTO: 31.9 PG (ref 26–34)
MCHC RBC AUTO-ENTMCNC: 33 G/DL (ref 31–36)
MCV RBC AUTO: 96.6 FL (ref 80–100)
PDW BLD-RTO: 14.3 % (ref 12.4–15.4)
PLATELET # BLD: 250 K/UL (ref 135–450)
PMV BLD AUTO: 9.2 FL (ref 5–10.5)
RBC # BLD: 3.78 M/UL (ref 4–5.2)
WBC # BLD: 10.2 K/UL (ref 4–11)

## 2020-09-13 PROCEDURE — 99024 POSTOP FOLLOW-UP VISIT: CPT | Performed by: SURGERY

## 2020-09-13 PROCEDURE — 6360000002 HC RX W HCPCS: Performed by: NURSE PRACTITIONER

## 2020-09-13 PROCEDURE — APPNB30 APP NON BILLABLE TIME 0-30 MINS: Performed by: NURSE PRACTITIONER

## 2020-09-13 PROCEDURE — 6370000000 HC RX 637 (ALT 250 FOR IP): Performed by: NURSE PRACTITIONER

## 2020-09-13 PROCEDURE — 6360000002 HC RX W HCPCS: Performed by: SURGERY

## 2020-09-13 PROCEDURE — 85027 COMPLETE CBC AUTOMATED: CPT

## 2020-09-13 PROCEDURE — 2580000003 HC RX 258: Performed by: NURSE PRACTITIONER

## 2020-09-13 PROCEDURE — APPSS15 APP SPLIT SHARED TIME 0-15 MINUTES: Performed by: NURSE PRACTITIONER

## 2020-09-13 PROCEDURE — 1200000000 HC SEMI PRIVATE

## 2020-09-13 PROCEDURE — 6370000000 HC RX 637 (ALT 250 FOR IP): Performed by: SURGERY

## 2020-09-13 RX ORDER — ACETAMINOPHEN 500 MG
1000 TABLET ORAL ONCE
Status: COMPLETED | OUTPATIENT
Start: 2020-09-13 | End: 2020-09-13

## 2020-09-13 RX ORDER — METOCLOPRAMIDE HYDROCHLORIDE 5 MG/ML
10 INJECTION INTRAMUSCULAR; INTRAVENOUS EVERY 6 HOURS
Status: COMPLETED | OUTPATIENT
Start: 2020-09-13 | End: 2020-09-14

## 2020-09-13 RX ORDER — BISACODYL 10 MG
10 SUPPOSITORY, RECTAL RECTAL ONCE
Status: COMPLETED | OUTPATIENT
Start: 2020-09-13 | End: 2020-09-13

## 2020-09-13 RX ORDER — MORPHINE SULFATE 2 MG/ML
2 INJECTION, SOLUTION INTRAMUSCULAR; INTRAVENOUS
Status: DISCONTINUED | OUTPATIENT
Start: 2020-09-13 | End: 2020-09-15

## 2020-09-13 RX ORDER — ACETAMINOPHEN 500 MG
1000 TABLET ORAL EVERY 8 HOURS SCHEDULED
Status: DISCONTINUED | OUTPATIENT
Start: 2020-09-13 | End: 2020-09-15 | Stop reason: HOSPADM

## 2020-09-13 RX ADMIN — MORPHINE SULFATE 2 MG: 2 INJECTION, SOLUTION INTRAMUSCULAR; INTRAVENOUS at 14:26

## 2020-09-13 RX ADMIN — HYDROMORPHONE HYDROCHLORIDE 1 MG: 1 INJECTION, SOLUTION INTRAMUSCULAR; INTRAVENOUS; SUBCUTANEOUS at 03:06

## 2020-09-13 RX ADMIN — MORPHINE SULFATE 2 MG: 2 INJECTION, SOLUTION INTRAMUSCULAR; INTRAVENOUS at 09:10

## 2020-09-13 RX ADMIN — PANTOPRAZOLE SODIUM 40 MG: 40 TABLET, DELAYED RELEASE ORAL at 05:02

## 2020-09-13 RX ADMIN — METOCLOPRAMIDE 10 MG: 5 INJECTION, SOLUTION INTRAMUSCULAR; INTRAVENOUS at 09:10

## 2020-09-13 RX ADMIN — MORPHINE SULFATE 2 MG: 2 INJECTION, SOLUTION INTRAMUSCULAR; INTRAVENOUS at 20:58

## 2020-09-13 RX ADMIN — METOCLOPRAMIDE 10 MG: 5 INJECTION, SOLUTION INTRAMUSCULAR; INTRAVENOUS at 20:58

## 2020-09-13 RX ADMIN — HYDROMORPHONE HYDROCHLORIDE 1 MG: 1 INJECTION, SOLUTION INTRAMUSCULAR; INTRAVENOUS; SUBCUTANEOUS at 01:06

## 2020-09-13 RX ADMIN — ACETAMINOPHEN 1000 MG: 500 TABLET, FILM COATED ORAL at 14:24

## 2020-09-13 RX ADMIN — SODIUM CHLORIDE: 9 INJECTION, SOLUTION INTRAVENOUS at 16:21

## 2020-09-13 RX ADMIN — MORPHINE SULFATE 2 MG: 2 INJECTION, SOLUTION INTRAMUSCULAR; INTRAVENOUS at 23:58

## 2020-09-13 RX ADMIN — SODIUM CHLORIDE: 9 INJECTION, SOLUTION INTRAVENOUS at 04:05

## 2020-09-13 RX ADMIN — HYDROMORPHONE HYDROCHLORIDE 1 MG: 1 INJECTION, SOLUTION INTRAMUSCULAR; INTRAVENOUS; SUBCUTANEOUS at 05:02

## 2020-09-13 RX ADMIN — ACETAMINOPHEN 1000 MG: 500 TABLET, FILM COATED ORAL at 20:58

## 2020-09-13 RX ADMIN — BISACODYL 10 MG: 10 SUPPOSITORY RECTAL at 09:11

## 2020-09-13 RX ADMIN — ENOXAPARIN SODIUM 40 MG: 40 INJECTION SUBCUTANEOUS at 20:58

## 2020-09-13 RX ADMIN — METOCLOPRAMIDE 10 MG: 5 INJECTION, SOLUTION INTRAMUSCULAR; INTRAVENOUS at 14:25

## 2020-09-13 RX ADMIN — ACETAMINOPHEN 1000 MG: 500 TABLET, FILM COATED ORAL at 09:11

## 2020-09-13 RX ADMIN — HYDROMORPHONE HYDROCHLORIDE 1 MG: 1 INJECTION, SOLUTION INTRAMUSCULAR; INTRAVENOUS; SUBCUTANEOUS at 07:00

## 2020-09-13 ASSESSMENT — PAIN SCALES - GENERAL
PAINLEVEL_OUTOF10: 8
PAINLEVEL_OUTOF10: 5
PAINLEVEL_OUTOF10: 7
PAINLEVEL_OUTOF10: 7
PAINLEVEL_OUTOF10: 6
PAINLEVEL_OUTOF10: 4
PAINLEVEL_OUTOF10: 7
PAINLEVEL_OUTOF10: 7
PAINLEVEL_OUTOF10: 8
PAINLEVEL_OUTOF10: 7
PAINLEVEL_OUTOF10: 6
PAINLEVEL_OUTOF10: 6
PAINLEVEL_OUTOF10: 4
PAINLEVEL_OUTOF10: 7
PAINLEVEL_OUTOF10: 8
PAINLEVEL_OUTOF10: 7

## 2020-09-13 ASSESSMENT — PAIN DESCRIPTION - ONSET: ONSET: ON-GOING

## 2020-09-13 ASSESSMENT — PAIN DESCRIPTION - LOCATION
LOCATION: ABDOMEN

## 2020-09-13 ASSESSMENT — PAIN DESCRIPTION - PAIN TYPE
TYPE: SURGICAL PAIN

## 2020-09-13 ASSESSMENT — PAIN - FUNCTIONAL ASSESSMENT: PAIN_FUNCTIONAL_ASSESSMENT: ACTIVITIES ARE NOT PREVENTED

## 2020-09-13 ASSESSMENT — PAIN DESCRIPTION - DESCRIPTORS: DESCRIPTORS: CONSTANT

## 2020-09-13 ASSESSMENT — PAIN DESCRIPTION - FREQUENCY: FREQUENCY: CONTINUOUS

## 2020-09-13 ASSESSMENT — PAIN DESCRIPTION - PROGRESSION: CLINICAL_PROGRESSION: GRADUALLY IMPROVING

## 2020-09-13 NOTE — PROGRESS NOTES
Patient received morphine iv for pain, reglan iv, and she administered her suppository. Patient had a large bowel movement, brown/yellowish, loose, passed a lot of gas. Pt states relief.

## 2020-09-13 NOTE — PLAN OF CARE
Problem: Activity:  Goal: Risk for activity intolerance will decrease  Description: Risk for activity intolerance will decrease  9/13/2020 1935 by Stacy Kilgore RN  Outcome: Ongoing  9/13/2020 1148 by Pooja NAVA  Outcome: Ongoing     Problem:  Bowel/Gastric:  Goal: Bowel function will improve  Description: Bowel function will improve  9/13/2020 1935 by Stacy Kilgore RN  Outcome: Ongoing  9/13/2020 1148 by Pooja NAVA  Outcome: Met This Shift  Goal: Diagnostic test results will improve  Description: Diagnostic test results will improve  9/13/2020 1935 by Stacy Kilgore RN  Outcome: Ongoing  9/13/2020 1148 by Pooja NAVA  Outcome: Ongoing  Goal: Occurrences of nausea will decrease  Description: Occurrences of nausea will decrease  9/13/2020 1935 by Stacy Kilgore RN  Outcome: Ongoing  9/13/2020 1148 by Pooja NAVA  Outcome: Ongoing  Goal: Occurrences of vomiting will decrease  Description: Occurrences of vomiting will decrease  9/13/2020 1935 by Stacy Kilgore RN  Outcome: Ongoing  9/13/2020 1148 by Pooja NAVA  Outcome: Ongoing     Problem: Fluid Volume:  Goal: Maintenance of adequate hydration will improve  Description: Maintenance of adequate hydration will improve  9/13/2020 1935 by Stacy Kilgore RN  Outcome: Ongoing  9/13/2020 1148 by Pooja NAVA  Outcome: Ongoing     Problem: Health Behavior:  Goal: Ability to state signs and symptoms to report to health care provider will improve  Description: Ability to state signs and symptoms to report to health care provider will improve  9/13/2020 1935 by Stacy Kilgore RN  Outcome: Ongoing  9/13/2020 1148 by Pooja NAVA  Outcome: Ongoing     Problem: Physical Regulation:  Goal: Complications related to the disease process, condition or treatment will be avoided or minimized  Description: Complications related to the disease process, condition or treatment will be avoided or minimized  9/13/2020 1935 by Dominique Bagley RN  Outcome: Ongoing  9/13/2020 1148 by Mya NAVA  Outcome: Ongoing  Goal: Ability to maintain clinical measurements within normal limits will improve  Description: Ability to maintain clinical measurements within normal limits will improve  9/13/2020 1935 by Dominique Bagley RN  Outcome: Ongoing  9/13/2020 1148 by Mya NAVA  Outcome: Ongoing     Problem: Sensory:  Goal: Ability to identify factors that increase the pain will improve  Description: Ability to identify factors that increase the pain will improve  9/13/2020 1935 by Dominique Bagley RN  Outcome: Ongoing  9/13/2020 1148 by Mya NAVA  Outcome: Ongoing  Goal: Ability to notify healthcare provider of pain before it becomes unmanageable or unbearable will improve  Description: Ability to notify healthcare provider of pain before it becomes unmanageable or unbearable will improve  9/13/2020 1935 by Dominique Bagley RN  Outcome: Ongoing  9/13/2020 1148 by Mya NAVA  Outcome: Ongoing  Goal: Pain level will decrease  Description: Pain level will decrease  9/13/2020 1935 by Dominique Bagley RN  Outcome: Ongoing  9/13/2020 1148 by Mya NAVA  Outcome: Ongoing     Problem: Anxiety:  Goal: Level of anxiety will decrease  Description: Level of anxiety will decrease  9/13/2020 1935 by Dominique Bagley RN  Outcome: Ongoing  9/13/2020 1148 by Mya NAVA  Outcome: Ongoing     Problem: Pain:  Goal: Control of acute pain  Description: Control of acute pain  9/13/2020 1935 by Dominique Bagley RN  Outcome: Ongoing  9/13/2020 1148 by Mya NAVA  Outcome: Ongoing

## 2020-09-13 NOTE — PROGRESS NOTES
[facility]        Progress Note    Patient Name: Chris Ash  MRN: [id number]  YOB: 1985  Date of Evaluation: 2020    Subjective:  No acute events overnight  Pain the same as yesterday, continues to have \"gas\" pain, requiring frequent dosing of Dilaudid  Denies nausea or vomiting  No flatus or BM, remains bloated  Ambulating frequently, tolerates well      Post-Operative Day #2    Vital Signs:  Patient Vitals for the past 24 hrs:   BP Temp Temp src Pulse Resp SpO2   20 0900 132/87 97.6 °F (36.4 °C) Oral 65 20 --   20 0403 124/79 98.2 °F (36.8 °C) Oral 65 16 97 %   20 0015 (!) 145/87 97.6 °F (36.4 °C) Oral 64 16 97 %   20 2048 128/81 98.7 °F (37.1 °C) Oral 63 16 97 %   20 1658 113/69 98.1 °F (36.7 °C) Oral 65 16 99 %   20 1149 126/79 -- -- 62 12 --      TEMPERATURE HISTORY 24H: Temp (24hrs), Av °F (36.7 °C), Min:97.6 °F (36.4 °C), Max:98.7 °F (37.1 °C)    BLOOD PRESSURE HISTORY: Systolic (60ZEX), HQJ:815 , Min:113 , BWF:527    Diastolic (52BYH), DQB:73, Min:69, Max:88      Intake/Output:  I/O last 3 completed shifts: In: 9776 [I.V.:7571]  Out: -   No intake/output data recorded.   Drain/tube Output:       Physical Exam:  General: awake, alert, oriented to  person, place, time  Lungs: unlabored respirations  Abdomen: soft, mildly distended, incisional tenderness only, bowel sounds present   Skin/Wound: healing well, no drainage, no erythema, well approximated    Labs:  CBC:    Recent Labs     20  0659 20  0726   WBC 15.0* 10.2   HGB 13.1 12.1   HCT 38.8 36.6    250     BMP:    Recent Labs     20  0659      K 3.9      CO2 23   BUN 5*   CREATININE 0.7   GLUCOSE 93     Hepatic:    Recent Labs     2059   AST 21   ALT 14   BILITOT 0.4   ALKPHOS 53     Amylase:  No results found for: AMYLASE  Lipase:    Lab Results   Component Value Date    LIPASE 25.0 2020 [facility] [address]  Vascular Neurology  Comprehensive Stroke Center  Progress Note    Assessment/Plan:     * Cerebrovascular accident (CVA) due to embolism of left middle cerebral artery  57 y.o. female with significant past medical history of DM, HTN, HLD presented to hospital with multiple medical complaints.  The patient has been having slurred speech, BLE weakness, and urinary incontinence since 3/23.  LE weakness became progressively worse with the patient having difficulty/inability to walk for the last 1-2 days.  MRI brain revealed acute infarctions in posterolateral thalamus, territory of PCA.  No tPA due to the patient being outside of the treatment window.  No LVO, no intervention at this time.      Etiology unclear.  Pt has risk factors for small vessel disease as well as signs of chronic small vessel disease on imaging, but presentation of this infarct is inconsistent with an etiology of small vessel disease in terms of multiple non-adjacent areas of acute infarction.  Pattern more likely to be seen in patient with embolic etiology which can be associated with cocaine use.     Antithrombotics for secondary stroke prevention: Antiplatelets: Aspirin: 81 mg daily  Statins for secondary stroke prevention and hyperlipidemia, if present:  Atorvastatin 40 mg daily  Aggressive risk factor modification: HTN, Smoking, DM, HLD, illicit substance use  Rehab efforts: PT/OT/SLP to evaluate and treat, PM&R consult  - rehab placement   Diagnostics ordered/pending: NA  VTE prophylaxis: Heparin 5000 units SQ every 8 hours, SCDs  BP parameters: Infarct: SBP <180     Choreiform movement  -Localized to RUE > RLE  -Improving on a daily basis  -Discussed with Dr. Eubanks, will monitor.  Can consider aripiprazole if interfering with PT/OT.    Weakness of both lower extremities  -Patient w/BLE weakness that has gotten progressively worse.  +urinary incontinence, LBP.  -Tylenol prn for pain for now, stable and not  requiring further prns  -MRI L spine w/o contrast--some R-sided disc protrusion L3-L4 with possible R L3 impingement  -Consulted PT/OT/PM&R, appreciate recs--recommending inpatient rehab, pending acceptance    Cocaine abuse  -Counseled on importance of cessation  -Community resource planning on discharge  -No consult to Addiction Psych as pt still denies cocaine use as an issue    Small vessel disease, cerebrovascular  -Pt with risk factors--HTN, HLD, poorly controlled DM II, cocaine abuse  -MRI findings of lacunes, multiple white matter hyperintensities, global atrophy  -No clear cerebral microbleeds on 2D echo planar imaging available  -Mgmt per above--ASA, atorvastatin, BP control, DM II control, cocaine abstinence, diet/exercise recs    Mixed hyperlipidemia  -Stroke risk factor.  Last LDL in 2018 was 118.  Current LDL pending.  -Patient prescribed rosuvastatin, non-compliant.    -Continue atorvastatin 40 mg daily.    Smoker  -Stroke risk factor.  Patient endorses smoking 1.5 ppd x 30 yrs--45 pkyr hx  -Multiple medications prescribed for smoking cessation that pt has not taken.  -Continue to encourage cessation  -Nicotine patch prn    Essential hypertension  -Stroke risk factor.  SBP < 180.  -Patient on amlodipine, valsartan, HCTZ at home  -Olmesartan 40 mg qd, HCTZ 25 mg qd, amlodipine 10 mg qd started 04/03/19    Uncontrolled type 2 diabetes mellitus with hyperglycemia  -Stroke risk factor. Patient reports glucose at home has been >500.  -DKA confirmed on admission, now with hyperglycemia without anion gap or ketosis   -Hospital medicine consulted--detemir 28 U bid, aspart 15 U tidwm started   -BG over past 24 hours 198-296 mg/dL    Trichomonas infection  -UA + for trichomonas.   -Pt received metronidazole 2 g x 1 dose, IM then treated with Azithro 1 g x 1 dose  -Discussed with patient importance of informing partners, not naming any to contact at this time    Diabetic ketoacidosis without coma associated with  type 2 diabetes mellitus  -BHB 4.1 on admission  -DKA resolved, IM 6 assisting with BG management    04/01/19:  [place] [facility] [place]  UDS + for cocaine, +DKA, presents with sxs of dysarthria, BLE weakness.    04/02/19:  DKA ongoing, insulin gtt started.  Will get IM consult ordered o/n.  04/03/19:  Mild R pronator drift on exam. BG > 400, IM adjusting insulin. Restarted BP medications.   04/04/19:  Choreiform movements improving, asking movement disorder MD for input.  Plan for inpatient rehab.  04/05/19:  Pending inpatient rehab placement.  Concern for embolic infarcts, may consider ESUS study enrollment.    STROKE DOCUMENTATION        NIH Scale:  1a. Level of Consciousness: 0-->Alert, keenly responsive  1b. LOC Questions: 0-->Answers both questions correctly  1c. LOC Commands: 0-->Performs both tasks correctly  2. Best Gaze: 0-->Normal  3. Visual: 0-->No visual loss  4. Facial Palsy: 1-->Minor paralysis (flattened nasolabial fold, asymmetry on smiling)  5a. Motor Arm, Left: 0-->No drift, limb holds 90 (or 45) degrees for full 10 secs  5b. Motor Arm, Right: 0-->No drift, limb holds 90 (or 45) degrees for full 10 secs  6a. Motor Leg, Left: 0-->No drift, leg holds 30 degree position for full 5 secs  6b. Motor Leg, Right: 1-->Drift, leg falls by the end of the 5-sec period but does not hit bed  7. Limb Ataxia: 0-->Absent  8. Sensory: 0-->Normal, no sensory loss  9. Best Language: 0-->No aphasia, normal  10. Dysarthria: 1-->Mild-to-moderate dysarthria, patient slurs at least some words and, at worst, can be understood with some difficulty  11. Extinction and Inattention (formerly Neglect): 0-->No abnormality  Total (NIH Stroke Scale): 3     Modified Branch Score: 1  Spencer Coma Scale:15   ABCD2 Score:    [id number] Score:   HAS -BLED Score:   ICH Score:   Hunt & Mueller Classification:      Hemorrhagic change of an Ischemic Stroke: Does this patient have an ischemic stroke with hemorrhagic changes? No     Neurologic Chief  Complaint: Dysarthria, BLE weakness    Subjective:     Interval History: Patient is seen for follow-up neurological assessment and treatment recommendations:   Patient with complaints of not sleeping well last night, states that she takes Valium at home.  Don't feel comfortable starting her on this given hx of substance abuse so will trial ramelteon tonight.  Pt knows that she cannot get HHC due to insurance, states that she has to go to inpatient rehab.  Had told Allen Niño person evaluating her yesterday that she did not want to go, can only do 1 hour of rehab.  She reaffirms that she does not want to go, but knows that she has no other choice.  Otherwise, no new complaints/concerns.  No family at bedside today.      HPI, Past Medical, Family, and Social History remains the same as documented in the initial encounter.  Review of Systems   Constitutional: Negative for chills and fever.   HENT: Negative for rhinorrhea and sneezing.    Eyes: Negative for photophobia and visual disturbance.   Respiratory: Negative for cough and shortness of breath.    Gastrointestinal: Negative for nausea and vomiting.   Genitourinary:        Pt cites urinary incontinence--but describes urgency later, for at least 1 week   Musculoskeletal: Positive for arthralgias, gait problem and myalgias.   Skin: Negative for rash and wound.   Neurological: Positive for speech difficulty and weakness.   Hematological: Negative for adenopathy. Does not bruise/bleed easily.   Psychiatric/Behavioral: Negative for agitation and confusion.     Scheduled Meds:   amLODIPine  10 mg Oral Daily    aspirin  81 mg Oral Daily    atorvastatin  40 mg Oral Daily    heparin (porcine)  5,000 Units Subcutaneous Q8H    hydroCHLOROthiazide  25 mg Oral Daily    insulin aspart U-100  15 Units Subcutaneous TIDWM    insulin detemir U-100  28 Units Subcutaneous BID    nicotine  1 patch Transdermal Daily    olmesartan  40 mg Oral Daily    pantoprazole  40 mg  Oral Daily     Continuous Infusions:   sodium chloride 0.9%       PRN Meds:acetaminophen, albuterol-ipratropium, dextrose 10 % in water (D10W), dextrose 10 % in water (D10W), dextrose 50%, dextrose 50%, glucagon (human recombinant), glucose, glucose, hydrALAZINE, insulin aspart U-100, ondansetron, ondansetron, sodium chloride 0.9%, sodium chloride 0.9%    Objective:     Vital Signs (Most Recent):  Temp: 98.4 °F (36.9 °C) (04/05/19 1325)  Pulse: 80 (04/05/19 1325)  Resp: 18 (04/05/19 1325)  BP: 118/76 (04/05/19 1325)  SpO2: 96 % (04/05/19 1325)  BP Location: Left arm    Vital Signs Range (Last 24H):  Temp:  [96.8 °F (36 °C)-98.9 °F (37.2 °C)]   Pulse:  [73-96]   Resp:  [14-18]   BP: (118-165)/()   SpO2:  [95 %-98 %]   BP Location: Left arm    Physical Exam   Constitutional: She is oriented to person, place, and time. She appears well-developed and well-nourished.   HENT:   Head: Normocephalic and atraumatic.   Mouth/Throat: Oropharynx is clear and moist. No oropharyngeal exudate.   Eyes: Pupils are equal, round, and reactive to light. Conjunctivae and EOM are normal.   Neck: Normal range of motion. Neck supple.   Cardiovascular: Intact distal pulses.   Pulmonary/Chest: Effort normal and breath sounds normal. No respiratory distress.   Abdominal: Soft. She exhibits no distension.   Musculoskeletal: She exhibits no edema or tenderness.   Neurological: She is alert and oriented to person, place, and time. No cranial nerve deficit or sensory deficit. She exhibits normal muscle tone.   +very mild choreiform movements RUE > RLE.  Improving daily.  On motor testing, has some poor effort.  In RLE states it is 2/2 pain in low back/hip.   Skin: Skin is warm and dry. She is not diaphoretic. No erythema.     Neurological Exam:   LOC: alert  Attention Span: Good   Language: No aphasia  Articulation: Dysarthria  Orientation: Person, Place, Time   Visual Fields: Full  EOM (CN III, IV, VI): Full/intact  Pupils (CN II, III):  PERRL  Facial Sensation (CN V): Normal  Facial Movement (CN VII): Symmetric facial expression    Motor: Arm left  Normal 5/5  Leg left  Normal 5/5  Arm right  Normal 5/5  Leg right Paresis: 4/5  Cebellar: No evidence of appendicular or axial ataxia  Sensation: Tactile extinction to bilateral simultaneous stimulation   Tone: Normal tone throughout    Laboratory:  CMP:   Recent Labs   Lab 04/05/19  0442   CALCIUM 10.1      K 4.4   CO2 30*      BUN 11   CREATININE 1.0     CBC:   Recent Labs   Lab 04/03/19  0422   WBC 8.45   RBC 4.69   HGB 13.1   HCT 39.2      MCV 84   MCH 27.9   MCHC 33.4     Lipid Panel: No results for input(s): CHOL, LDLCALC, HDL, TRIG in the last 168 hours.  Coagulation:   Recent Labs   Lab 04/02/19  0310   INR 0.9   APTT <21.0     Hgb A1C:   Recent Labs   Lab 04/02/19  0042   HGBA1C >14.0*  >14.0*     TSH:   Recent Labs   Lab 04/02/19  0042   TSH 0.988     Diagnostic Results   Brain Imaging   04/01/19 MRI Brain w/ w/o contrast:  Advanced involutional change.  Acute infarcts left putamen, posterolateral left thalamus and left corona radiata and deep white matter adjacent to the left ventricular trigone.  Remote lacunar infarcts left cerebellum, right thalamus, right external capsule, right corona radiata, bilateral basal ganglia and bilateral deep frontal white matter.  Supratentorial and pontine white matter T2/flair hyperintense signal foci suggesting sequela of chronic small vessel ischemic change.  Left sphenoid sinus disease.           04/01/19 CT head w/o contrast:  No acute large vascular territory infarct or intracranial hemorrhage identified.  Further evaluation/follow-up as warranted.  Periventricular white matter hypoattenuation, likely sequela of chronic small vessel ischemic change.  Few more focal areas of hypoattenuation at the right basal ganglia, left internal capsule and left caudate suggesting age-indeterminate lacunar type infarcts.  Correlate  clinically.    Vessel Imaging   04/02/19 CTA head, neck:  CTA:  No hemodynamically significant stenosis or large vessel occlusion identified.    Cardiac Imaging   04/02/19 TTE Complete:  · Normal left ventricular systolic function. The estimated ejection fraction is 68%  · Concentric left ventricular remodeling.  · Normal LV diastolic function.  · No wall motion abnormalities.  · Normal right ventricular systolic function.  · Mild mitral sclerosis.  · Normal central venous pressure (3 mm Hg).  · The estimated PA systolic pressure is 15 mm Hg    Delmi Corona MD  Comprehensive Stroke Center  Department of Vascular Neurology   [facility]

## 2020-09-13 NOTE — PLAN OF CARE
Problem: Bowel/Gastric:  Goal: Bowel function will improve  Description: Bowel function will improve  Outcome: Met This Shift     Problem: Activity:  Goal: Risk for activity intolerance will decrease  Description: Risk for activity intolerance will decrease  Outcome: Ongoing     Problem:  Bowel/Gastric:  Goal: Diagnostic test results will improve  Description: Diagnostic test results will improve  Outcome: Ongoing  Goal: Occurrences of nausea will decrease  Description: Occurrences of nausea will decrease  Outcome: Ongoing  Goal: Occurrences of vomiting will decrease  Description: Occurrences of vomiting will decrease  Outcome: Ongoing     Problem: Fluid Volume:  Goal: Maintenance of adequate hydration will improve  Description: Maintenance of adequate hydration will improve  Outcome: Ongoing     Problem: Health Behavior:  Goal: Ability to state signs and symptoms to report to health care provider will improve  Description: Ability to state signs and symptoms to report to health care provider will improve  Outcome: Ongoing     Problem: Physical Regulation:  Goal: Complications related to the disease process, condition or treatment will be avoided or minimized  Description: Complications related to the disease process, condition or treatment will be avoided or minimized  Outcome: Ongoing  Goal: Ability to maintain clinical measurements within normal limits will improve  Description: Ability to maintain clinical measurements within normal limits will improve  Outcome: Ongoing     Problem: Sensory:  Goal: Ability to identify factors that increase the pain will improve  Description: Ability to identify factors that increase the pain will improve  Outcome: Ongoing  Goal: Ability to notify healthcare provider of pain before it becomes unmanageable or unbearable will improve  Description: Ability to notify healthcare provider of pain before it becomes unmanageable or unbearable will improve  Outcome: Ongoing  Goal: Pain level will decrease  Description: Pain level will decrease  Outcome: Ongoing     Problem: Anxiety:  Goal: Level of anxiety will decrease  Description: Level of anxiety will decrease  Outcome: Ongoing     Problem: Pain:  Goal: Control of acute pain  Description: Control of acute pain  Outcome: Ongoing

## 2020-09-13 NOTE — PROGRESS NOTES
[facility] PROGRESS NOTE    9/13/2020 1:35 PM        Name: Maryann Yancey . Admitted: 9/9/2020  Primary Care Provider: Jhonny Sheldon MD (Tel: [phone])      Subjective:  Patient is a 29 yo female with hx anxiety/depression. She presented to ER with c/o abdominal pain which has been off and on for the past 5 months but worse over the past few days. Associated with n/v/d. Evaluated in ER on Tuesday. CT scan showed questionable mass vs inflammation and she was discharged home on Cipro and Flagyl. The pain worsened prompting return to hospital.     9/11/2020: laparoscopic right hemicolectomy    Presently resting in bed. Continues to have \"gas\"pains, no flatus or BM but will burp at times. Requiring dilaudid ~ q 2 hours, has been switched to morphine per surgery. Remains NPO except for ice. Denies shortness of breath.     Reviewed interval ancillary notes    Current Medications  acetaminophen (TYLENOL) tablet 1,000 mg, 3 times per day  morphine (PF) injection 2 mg, Q3H PRN  metoclopramide (REGLAN) injection 10 mg, Q6H  enoxaparin (LOVENOX) injection 40 mg, Nightly  ondansetron (ZOFRAN) injection 4 mg, Q6H PRN  0.9 % sodium chloride infusion, Continuous  pantoprazole (PROTONIX) tablet 40 mg, QAM AC  sodium chloride flush 0.9 % injection 10 mL, 2 times per day  sodium chloride flush 0.9 % injection 10 mL, PRN  polyethylene glycol (GLYCOLAX) packet 17 g, Daily PRN  promethazine (PHENERGAN) tablet 12.5 mg, Q6H PRN        Objective:  /87   Pulse 65   Temp 97.6 °F (36.4 °C) (Oral)   Resp 20   Ht 5' 9\" (1.753 m)   Wt 186 lb (84.4 kg)   SpO2 97%   BMI 27.47 kg/m²     Intake/Output Summary (Last 24 hours) at 9/13/2020 1335  Last data filed at 9/13/2020 1137  Gross per 24 hour   Intake 1781 ml   Output --   Net 1781 ml      Wt Readings from Last 3 Encounters:   09/09/20 186 lb (84.4 kg)   09/08/20 186 lb (84.4 kg)   01/06/20 195 lb 1 oz (88.5 kg)     General:  Awake, alert, oriented in NAD  Skin:  Warm and dry. No unusual bruising or rash  Neck:  Supple. No JVD appreciated  Chest:  Normal effort. Clear to auscultation  Cardiovascular:  RRR, normal S1/S2, no murmur/gallop/rub  Abdomen:  Soft, mild operative tenderness, +bowel sounds  Extremities:  No edema  Neurological: No focal deficits  Psychological: Normal mood and affect      Labs and Tests:  CBC:   Recent Labs     09/12/20 0659 09/13/20  0726   WBC 15.0* 10.2   HGB 13.1 12.1    250     BMP:    Recent Labs     09/12/20  0659      K 3.9      CO2 23   BUN 5*   CREATININE 0.7   GLUCOSE 93     Hepatic:   Recent Labs     09/12/20  0659   AST 21   ALT 14   BILITOT 0.4   ALKPHOS 53       CT Abdomen and Pelvis 9/8/2020:  FINDINGS:    Lower Chest: No evidence of pneumonia or other acute findings.         Organs: There is a low-density lesion in the posterior dome of the liver    measuring 1.8 cm.  Margins appear ill-defined.  Normal appearance of the    gallbladder.  No evidence of pancreatitis.  No ureteral stone or    hydronephrosis. No evidence of pyelonephritis.         GI/Bowel: At the ileocecal valve there is rounded increased density.  This is    not well evaluated due to collapse and lack of contrast.  Adjacent stranding    of fat appears present, and there appears to be mild wall thickening of the    adjacent cecum.  Prominent adjacent lymph nodes are present in the right    lower quadrant measuring up to 1.4 x 0.8 cm, mildly enlarged.  No bowel    obstruction.  Normal appearance of the appendix.         Pelvis: No acute abnormality of the pelvis.  No evidence of cystitis.         Peritoneum/Retroperitoneum: No free air, ascites or abscess.         Bones/Soft Tissues: No fracture or other acute osseous process.              Impression    Masslike area at the ileocecal valve and adjacent cecum.  This may either be    due to malignancy or focal infection.  Adjacent metastatic versus    reactive/inflammatory mildly enlarged lymph nodes.  Colonoscopy recommended.         Indeterminate low-density lesion at the dome of the liver which may be a    metastatic focus. Chris [place] also in the differential diagnosis.  If further    imaging evaluation is needed a pre and post contrast liver mass protocol MRI    examination would be recommended.             Colonoscopy 9/10/2020:  IMPRESSION :  Friable ulcerated mass involving the ICV and deeper into  the cecum. Separate from the append orifice. Unable to  intubate ileum. PLAN :   Await path - rush put on path  surgical consult  CEA  clears until seen by surgery      Problem List  Active Problems:    Abdominal pain    Cecum mass  Resolved Problems:    * No resolved hospital problems. *       Assessment & Plan:   1. Cecal mass. Abnormal CT Scan showing mass like area at ICV and cecum. Colonoscopy 9/10 revealed friable ulcerated mass involving the ICV and deeper into the cecum. Pathology showed high grade dysplasia, adenocarcinoma could not be excluded. She is s/p lap right hemicolectomy 9/11, surgical pathology pending. Presently NPO except ice chips. Continue IV fluids, prn pain meds. WBC stable, CBC and BMP in am. Remains afebrile. 2. Liver lesion. CT scan noted lesion in dome of liver concerning for hemangioma vs mets. Will need MRI at some point to further characterize. GI on case. 3. Leukocytosis. Improved. WBC 15.0->10.2. Remains afebrile. Possibly reactive.         Diet: Diet NPO Effective Now Exceptions are: Ice Chips, Sips with Meds, Popsicles  Code:Full Code  DVT PPX: enoxaparin      VIV Dukes - CNP   9/13/2020 1:35 PM

## 2020-09-14 LAB
ANION GAP SERPL CALCULATED.3IONS-SCNC: 11 MMOL/L (ref 3–16)
BUN BLDV-MCNC: 5 MG/DL (ref 7–20)
CALCIUM SERPL-MCNC: 9.1 MG/DL (ref 8.3–10.6)
CHLORIDE BLD-SCNC: 102 MMOL/L (ref 99–110)
CO2: 23 MMOL/L (ref 21–32)
CREAT SERPL-MCNC: 0.7 MG/DL (ref 0.6–1.1)
GFR AFRICAN AMERICAN: >60
GFR NON-AFRICAN AMERICAN: >60
GLUCOSE BLD-MCNC: 82 MG/DL (ref 70–99)
HCT VFR BLD CALC: 39.4 % (ref 36–48)
HEMOGLOBIN: 13.4 G/DL (ref 12–16)
MCH RBC QN AUTO: 32.8 PG (ref 26–34)
MCHC RBC AUTO-ENTMCNC: 34 G/DL (ref 31–36)
MCV RBC AUTO: 96.5 FL (ref 80–100)
PDW BLD-RTO: 13.8 % (ref 12.4–15.4)
PLATELET # BLD: 280 K/UL (ref 135–450)
PMV BLD AUTO: 8.6 FL (ref 5–10.5)
POTASSIUM SERPL-SCNC: 3.8 MMOL/L (ref 3.5–5.1)
RBC # BLD: 4.09 M/UL (ref 4–5.2)
SODIUM BLD-SCNC: 136 MMOL/L (ref 136–145)
WBC # BLD: 12.9 K/UL (ref 4–11)

## 2020-09-14 PROCEDURE — 2580000003 HC RX 258: Performed by: NURSE PRACTITIONER

## 2020-09-14 PROCEDURE — 2580000003 HC RX 258: Performed by: SURGERY

## 2020-09-14 PROCEDURE — 6370000000 HC RX 637 (ALT 250 FOR IP): Performed by: SURGERY

## 2020-09-14 PROCEDURE — 85027 COMPLETE CBC AUTOMATED: CPT

## 2020-09-14 PROCEDURE — 6360000002 HC RX W HCPCS: Performed by: SURGERY

## 2020-09-14 PROCEDURE — APPNB30 APP NON BILLABLE TIME 0-30 MINS: Performed by: NURSE PRACTITIONER

## 2020-09-14 PROCEDURE — 6370000000 HC RX 637 (ALT 250 FOR IP): Performed by: NURSE PRACTITIONER

## 2020-09-14 PROCEDURE — 1200000000 HC SEMI PRIVATE

## 2020-09-14 PROCEDURE — 99024 POSTOP FOLLOW-UP VISIT: CPT | Performed by: SURGERY

## 2020-09-14 PROCEDURE — 87506 IADNA-DNA/RNA PROBE TQ 6-11: CPT

## 2020-09-14 PROCEDURE — 6360000002 HC RX W HCPCS: Performed by: NURSE PRACTITIONER

## 2020-09-14 PROCEDURE — 36415 COLL VENOUS BLD VENIPUNCTURE: CPT

## 2020-09-14 PROCEDURE — 80048 BASIC METABOLIC PNL TOTAL CA: CPT

## 2020-09-14 RX ADMIN — Medication 10 ML: at 20:25

## 2020-09-14 RX ADMIN — ACETAMINOPHEN 1000 MG: 500 TABLET, FILM COATED ORAL at 13:01

## 2020-09-14 RX ADMIN — MORPHINE SULFATE 2 MG: 2 INJECTION, SOLUTION INTRAMUSCULAR; INTRAVENOUS at 03:31

## 2020-09-14 RX ADMIN — MORPHINE SULFATE 2 MG: 2 INJECTION, SOLUTION INTRAMUSCULAR; INTRAVENOUS at 06:39

## 2020-09-14 RX ADMIN — METOCLOPRAMIDE 10 MG: 5 INJECTION, SOLUTION INTRAMUSCULAR; INTRAVENOUS at 03:31

## 2020-09-14 RX ADMIN — MORPHINE SULFATE 2 MG: 2 INJECTION, SOLUTION INTRAMUSCULAR; INTRAVENOUS at 09:43

## 2020-09-14 RX ADMIN — MORPHINE SULFATE 2 MG: 2 INJECTION, SOLUTION INTRAMUSCULAR; INTRAVENOUS at 13:01

## 2020-09-14 RX ADMIN — MORPHINE SULFATE 2 MG: 2 INJECTION, SOLUTION INTRAMUSCULAR; INTRAVENOUS at 17:13

## 2020-09-14 RX ADMIN — SODIUM CHLORIDE: 9 INJECTION, SOLUTION INTRAVENOUS at 09:46

## 2020-09-14 RX ADMIN — PANTOPRAZOLE SODIUM 40 MG: 40 TABLET, DELAYED RELEASE ORAL at 06:39

## 2020-09-14 RX ADMIN — ACETAMINOPHEN 1000 MG: 500 TABLET, FILM COATED ORAL at 06:39

## 2020-09-14 RX ADMIN — MORPHINE SULFATE 2 MG: 2 INJECTION, SOLUTION INTRAMUSCULAR; INTRAVENOUS at 20:19

## 2020-09-14 RX ADMIN — MORPHINE SULFATE 2 MG: 2 INJECTION, SOLUTION INTRAMUSCULAR; INTRAVENOUS at 23:23

## 2020-09-14 RX ADMIN — ENOXAPARIN SODIUM 40 MG: 40 INJECTION SUBCUTANEOUS at 20:25

## 2020-09-14 ASSESSMENT — PAIN DESCRIPTION - LOCATION
LOCATION: ABDOMEN

## 2020-09-14 ASSESSMENT — PAIN SCALES - GENERAL
PAINLEVEL_OUTOF10: 8
PAINLEVEL_OUTOF10: 4
PAINLEVEL_OUTOF10: 8
PAINLEVEL_OUTOF10: 3
PAINLEVEL_OUTOF10: 4
PAINLEVEL_OUTOF10: 8
PAINLEVEL_OUTOF10: 8
PAINLEVEL_OUTOF10: 9
PAINLEVEL_OUTOF10: 8
PAINLEVEL_OUTOF10: 7
PAINLEVEL_OUTOF10: 4

## 2020-09-14 ASSESSMENT — PAIN DESCRIPTION - PAIN TYPE
TYPE: SURGICAL PAIN
TYPE: ACUTE PAIN;SURGICAL PAIN
TYPE: SURGICAL PAIN

## 2020-09-14 NOTE — PROGRESS NOTES
Concepción Navarrete is a 30 y.o. female patient. Current Facility-Administered Medications   Medication Dose Route Frequency Provider Last Rate Last Dose    acetaminophen (TYLENOL) tablet 1,000 mg  1,000 mg Oral 3 times per day Ladon Schaumann, APRN - CNP   1,000 mg at 09/14/20 8429    morphine (PF) injection 2 mg  2 mg Intravenous Q3H PRN Ladon Schaumann, APRN - CNP   2 mg at 09/14/20 0943    enoxaparin (LOVENOX) injection 40 mg  40 mg Subcutaneous Nightly Edie Charles MD   40 mg at 09/13/20 2058    ondansetron (ZOFRAN) injection 4 mg  4 mg Intravenous Q6H PRN Edie Charles MD        0.9 % sodium chloride infusion   Intravenous Continuous Ladon Schaumann, APRN - CNP 50 mL/hr at 09/14/20 0946      pantoprazole (PROTONIX) tablet 40 mg  40 mg Oral QAM AC Edie Charles MD   40 mg at 09/14/20 6781    sodium chloride flush 0.9 % injection 10 mL  10 mL Intravenous 2 times per day Edie Charles MD   10 mL at 09/12/20 2053    sodium chloride flush 0.9 % injection 10 mL  10 mL Intravenous PRN Edie Charles MD        polyethylene glycol [facility]) packet 17 g  17 g Oral Daily PRN Edie Charles MD        [facility]) tablet 12.5 mg  12.5 mg Oral Q6H PRN Edie Charles MD         Allergies   Allergen Reactions    Ibuprofen Anaphylaxis     \"kidney failure at age 31\"     Active Problems:    Abdominal pain    Cecum mass  Resolved Problems:    * No resolved hospital problems. *    Blood pressure 137/80, pulse 81, temperature 97.6 °F (36.4 °C), temperature source Oral, resp. rate 16, height 5' 9\" (1.753 m), weight 186 lb (84.4 kg), SpO2 98 %, unknown if currently breastfeeding. Subjective:  Symptoms:  Stable. Diet:  Poor intake. Activity level: Normal.    Pain:  She complains of pain that is mild. Objective:  General Appearance:  Comfortable.     Vital signs: (most recent): Blood pressure 137/80, pulse 81, temperature 97.6 °F (36.4 °C), temperature source Oral, resp. rate 16, height 5' 9\" (1.753 m), weight 186 lb (84.4 kg), SpO2 98 %, unknown if currently breastfeeding. Output: Producing urine and producing stool. Lungs:  Normal effort and normal respiratory rate. Abdomen: Abdomen is soft. (Very mild abdominal distention. Abdomen appropriately tender. Incisions intact without evidence of infection. ). Neurological: Patient is alert and oriented to person, place and time. Skin:  Warm and dry. Assessment & Plan postop day #3 status post laparoscopic right colon resection. Had some abdominal distention over the weekend but this seems to be mostly resolved. Her appetite is good and she has having bowel function. Will advance to a general diet. White blood count mildly elevated at 12.9. Pathology pending. Will repeat CBC in a.m. Hopeful for discharge home tomorrow.     Abdullahi Peralta MD  9/14/2020

## 2020-09-14 NOTE — CARE COORDINATION
Chart reviewed for discharge planning. Barrier(s) to discharge-leukocytosis, cecal mass  Tentative discharge plan-home w/no needs  Tentative discharge date-hopeful 09/15/20    *Case management will continue to follow progress and update discharge plan as needed.     Electronically signed by PANKAJ Mariscal on 9/14/2020 at 1:39 PM

## 2020-09-14 NOTE — PLAN OF CARE
Problem: Activity:  Goal: Risk for activity intolerance will decrease  Description: Risk for activity intolerance will decrease  Outcome: Ongoing     Problem:  Bowel/Gastric:  Goal: Bowel function will improve  Description: Bowel function will improve  Outcome: Ongoing  Goal: Diagnostic test results will improve  Description: Diagnostic test results will improve  Outcome: Ongoing  Goal: Occurrences of nausea will decrease  Description: Occurrences of nausea will decrease  Outcome: Ongoing  Goal: Occurrences of vomiting will decrease  Description: Occurrences of vomiting will decrease  Outcome: Ongoing     Problem: Fluid Volume:  Goal: Maintenance of adequate hydration will improve  Description: Maintenance of adequate hydration will improve  Outcome: Ongoing     Problem: Health Behavior:  Goal: Ability to state signs and symptoms to report to health care provider will improve  Description: Ability to state signs and symptoms to report to health care provider will improve  Outcome: Ongoing     Problem: Physical Regulation:  Goal: Complications related to the disease process, condition or treatment will be avoided or minimized  Description: Complications related to the disease process, condition or treatment will be avoided or minimized  Outcome: Ongoing  Goal: Ability to maintain clinical measurements within normal limits will improve  Description: Ability to maintain clinical measurements within normal limits will improve  Outcome: Ongoing     Problem: Sensory:  Goal: Ability to identify factors that increase the pain will improve  Description: Ability to identify factors that increase the pain will improve  Outcome: Ongoing  Goal: Ability to notify healthcare provider of pain before it becomes unmanageable or unbearable will improve  Description: Ability to notify healthcare provider of pain before it becomes unmanageable or unbearable will improve  Outcome: Ongoing  Goal: Pain level will decrease  Description: Pain level will decrease  Outcome: Ongoing     Problem: Anxiety:  Goal: Level of anxiety will decrease  Description: Level of anxiety will decrease  Outcome: Ongoing     Problem: Pain:  Goal: Control of acute pain  Description: Control of acute pain  9/14/2020 1956 by Sarahy Oleary RN  Outcome: Ongoing  9/14/2020 0921 by Kaz Garcia RN  Outcome: Ongoing

## 2020-09-14 NOTE — PROGRESS NOTES
[facility] PROGRESS NOTE    9/14/2020 11:56 AM        Name: Rodolfo Medeiros . Admitted: 9/9/2020  Primary Care Provider: Hollis Perez MD (Tel: [phone])      Subjective:  Patient is a 29 yo female with hx anxiety/depression. She presented to ER with c/o abdominal pain which has been off and on for the past 5 months but worse over the past few days. Associated with n/v/d. Evaluated in ER on Tuesday. CT scan showed questionable mass vs inflammation and she was discharged home on Cipro and Flagyl. The pain worsened prompting return to hospital.     9/11/2020: laparoscopic right hemicolectomy    9/13/2020: BM x 2, narcotics weaned    Subjective:  She is in good spirits. Feels well, hungry. Wants to eat. +BM yesterday,     Reviewed interval ancillary notes    Current Medications  acetaminophen (TYLENOL) tablet 1,000 mg, 3 times per day  morphine (PF) injection 2 mg, Q3H PRN  enoxaparin (LOVENOX) injection 40 mg, Nightly  ondansetron (ZOFRAN) injection 4 mg, Q6H PRN  0.9 % sodium chloride infusion, Continuous  pantoprazole (PROTONIX) tablet 40 mg, QAM AC  sodium chloride flush 0.9 % injection 10 mL, 2 times per day  sodium chloride flush 0.9 % injection 10 mL, PRN  polyethylene glycol (GLYCOLAX) packet 17 g, Daily PRN  promethazine (PHENERGAN) tablet 12.5 mg, Q6H PRN        Objective:  /80   Pulse 81   Temp 97.6 °F (36.4 °C) (Oral)   Resp 16   Ht 5' 9\" (1.753 m)   Wt 186 lb (84.4 kg)   SpO2 98%   BMI 27.47 kg/m²     Intake/Output Summary (Last 24 hours) at 9/14/2020 1156  Last data filed at 9/13/2020 1621  Gross per 24 hour   Intake 961 ml   Output --   Net 961 ml      Wt Readings from Last 3 Encounters:   09/09/20 186 lb (84.4 kg)   09/08/20 186 lb (84.4 kg)   01/06/20 195 lb 1 oz (88.5 kg)     General:  Awake, alert, oriented in NAD  Skin:  Warm and dry. No unusual bruising or rash  Neck:  Supple. No JVD appreciated  Chest:  Normal effort. Clear to auscultation  Cardiovascular:  RRR, normal S1/S2, no murmur/gallop/rub  Abdomen:  Soft, mild operative tenderness, +bowel sounds  Extremities:  No edema  Neurological: No focal deficits  Psychological: Normal mood and affect      Labs and Tests:  CBC:   Recent Labs     09/12/20  0659 09/13/20  0726 09/14/20  0719   WBC 15.0* 10.2 12.9*   HGB 13.1 12.1 13.4    250 280     BMP:    Recent Labs     09/12/20  0659 09/14/20  0719    136   K 3.9 3.8    102   CO2 23 23   BUN 5* 5*   CREATININE 0.7 0.7   GLUCOSE 93 82     Hepatic:   Recent Labs     09/12/20  0659   AST 21   ALT 14   BILITOT 0.4   ALKPHOS 53       CT Abdomen and Pelvis 9/8/2020:  FINDINGS:    Lower Chest: No evidence of pneumonia or other acute findings.         Organs: There is a low-density lesion in the posterior dome of the liver    measuring 1.8 cm.  Margins appear ill-defined.  Normal appearance of the    gallbladder.  No evidence of pancreatitis.  No ureteral stone or    hydronephrosis. No evidence of pyelonephritis.         GI/Bowel: At the ileocecal valve there is rounded increased density.  This is    not well evaluated due to collapse and lack of contrast.  Adjacent stranding    of fat appears present, and there appears to be mild wall thickening of the    adjacent cecum.  Prominent adjacent lymph nodes are present in the right    lower quadrant measuring up to 1.4 x 0.8 cm, mildly enlarged.  No bowel    obstruction.  Normal appearance of the appendix.         Pelvis: No acute abnormality of the pelvis.  No evidence of cystitis.         Peritoneum/Retroperitoneum: No free air, ascites or abscess.         Bones/Soft Tissues: No fracture or other acute osseous process.              Impression    Masslike area at the ileocecal valve and adjacent cecum.  This may either be    due to malignancy or focal infection.  Adjacent metastatic versus    reactive/inflammatory mildly enlarged lymph nodes.  Colonoscopy recommended.         Indeterminate low-density lesion at the dome of the liver which may be a    metastatic focus. Linnea Leana also in the differential diagnosis.  If further    imaging evaluation is needed a pre and post contrast liver mass protocol MRI    examination would be recommended.             Colonoscopy 9/10/2020:  IMPRESSION :  Friable ulcerated mass involving the ICV and deeper into  the cecum. Separate from the append orifice. Unable to  intubate ileum. PLAN :   Await path - rush put on path  surgical consult  CEA  clears until seen by surgery      Problem List  Active Problems:    Abdominal pain    Cecum mass  Resolved Problems:    * No resolved hospital problems. *       Assessment & Plan:   1. Cecal mass. Abnormal CT Scan showing mass like area at ICV and cecum. Colonoscopy 9/10 revealed friable ulcerated mass involving the ICV and deeper into the cecum. Pathology showed high grade dysplasia, adenocarcinoma could not be excluded. She is s/p lap right hemicolectomy 9/11, surgical pathology pending. Presently NPO except ice chips. Hopeful to advance  this AM.  Continue IV fluids, prn pain meds. WBC stable, CBC and BMP in am. Remains afebrile. 2. Liver lesion. CT scan noted lesion in dome of liver concerning for hemangioma vs mets. Will need MRI at some point to further characterize. GI on case. 3. Leukocytosis. Improved. . Remains afebrile. Possibly reactive.         Diet: DIET GENERAL;  Code:Full Code  DVT PPX: enoxaparin      VIV Lizarraga - CNP   9/14/2020 11:56 AM

## 2020-09-14 NOTE — CONSULTS
[facility]   CONSULT NOTE    2020 1:41 PM    Patient Information: Lela Henderson   Date of Admit:  2020  Primary Care Physician:  Zoya Sifuentes MD  Requesting Physician:  Sonya Santoyo MD    Reason for consult:   Evaluation and recommendations for cecal mass, liver lesions. Chief complaint:    Chief Complaint   Patient presents with    Abdominal Pain     pt with c/o abd pain- states was seen here yesterday for same, told if worse come back- states it's worse. History of Present Illness:  Lela Henderson is a 30 y.o. female on Sonya Santoyo MD service who was admitted on 2020 for abdominal pain. She states she has had issues with bloating, diarrhea and abdominal pain for about 5 months. She reports good appetite, no weight loss. CT of the abdomen and pelvis showed mass at the ileocecal valve with adjacent lymphadenopathy. There are also indeterminate lesions of the liver. She underwent colonoscopy on 9/10/2020 which found an ulcerated mass in the cecum, pathology showed high grade dysplasia. She underwent laparoscopic right hemicolectomy on 2020, pathology pending. She denies personal and family history of cancer, particularly colon cancer. She reports history of iron deficiency anemia. She is not currently anemic. Past Medical History:     has a past medical history of Anxiety, Chronic back pain greater than 3 months duration, and Depression.      Past Surgical History:    Past Surgical History:   Procedure Laterality Date     SECTION      COLONOSCOPY N/A 9/10/2020    COLONOSCOPY WITH BIOPSY ILEO-CECAL VALVE performed by Charmaine Rubinstein, MD at [facility] COLONOSCOPY  9/10/2020    COLONOSCOPY POLYPECTOMY SNARE/COLD OF SIGMOID COLON POLYP performed by Charmaine Rubinstein, MD at [address] Right 2020    RIGHT BOWEL RESECTION HEMICOLECTOMY LAPAROSCOPIC performed by Ck Zacarias MD at [facility] Current Medications:     acetaminophen  1,000 mg Oral 3 times per day    enoxaparin  40 mg Subcutaneous Nightly    pantoprazole  40 mg Oral QAM AC    sodium chloride flush  10 mL Intravenous 2 times per day       Allergies: Allergies   Allergen Reactions    Ibuprofen Anaphylaxis     \"kidney failure at age 31\"        Social History:    reports that she has been smoking cigarettes. She has a 5.00 pack-year smoking history. She uses smokeless tobacco. She reports current alcohol use. She reports current drug use. Drug: Marijuana. Family History:     family history includes Arthritis in her paternal grandfather; Cancer in her paternal grandmother; Depression in her maternal aunt, mother, and sister; Diabetes in her mother and paternal grandmother; Heart Disease in her paternal grandfather; High Blood Pressure in her maternal grandfather, paternal grandfather, and paternal grandmother; High Cholesterol in her maternal grandfather, paternal grandfather, and paternal grandmother; Mental Illness in her maternal aunt; Stroke in her maternal grandfather, paternal grandfather, and paternal grandmother; Substance Abuse in her brother, maternal uncle, paternal uncle, and sister. ROS:      Constitutional: No weight loss, No fever, No chills, No night sweats. Energy level good. Eyes: No diplopia, No transient or permanent loss of vision, No scotomata. ENT / Mouth: No epistaxis, No dysphagia, No hoarseness, No oral ulcers, No gingival bleeding. No sore throat, No postnasal drip, No nasal drip, No mouth pain, No sinus pain, No tinnitus, Normal hearing. Cardiovascular: No chest pain, No palpitations, No syncope, No upper extremity edema, No lower extremity edema, No calf discomfort. Respiratory: No cough. No hemoptysis, No pleurisy, No wheezing, No dyspnea. Gastrointestinal: No abdominal cramping, No nausea, No vomiting, No constipation, No hemotochezia, No melena, No jaundice, No dyspepsia, No dysphagia. skin color, texture, turgor and no jaundice. appears intact   EXTREMITIES: no LE edema       DATA:  CBC:   Lab Results   Component Value Date    WBC 12.9 09/14/2020    RBC 4.09 09/14/2020    HGB 13.4 09/14/2020    HCT 39.4 09/14/2020    MCV 96.5 09/14/2020    MCH 32.8 09/14/2020    MCHC 34.0 09/14/2020    RDW 13.8 09/14/2020     09/14/2020    MPV 8.6 09/14/2020     BMP:  Lab Results   Component Value Date     09/14/2020    K 3.8 09/14/2020    K 3.9 09/12/2020     09/14/2020    CO2 23 09/14/2020    BUN 5 09/14/2020    CREATININE 0.7 09/14/2020    CALCIUM 9.1 09/14/2020    GFRAA >60 09/14/2020    GFRAA >60 03/15/2010    LABGLOM >60 09/14/2020    GLUCOSE 82 09/14/2020     Magnesium: No results found for: MG  LIVER PROFILE:   Recent Labs     09/12/20  0659   AST 21   ALT 14   BILITOT 0.4   ALKPHOS 53     PT/INR:  No results found for: PROTIME, INR    IMAGING:      Narrative    EXAMINATION:    CT OF THE ABDOMEN AND PELVIS WITH CONTRAST 9/8/2020 2:03 pm         TECHNIQUE:    CT of the abdomen and pelvis was performed with the administration of    intravenous contrast. Multiplanar reformatted images are provided for review. Dose modulation, iterative reconstruction, and/or weight based adjustment of    the mA/kV was utilized to reduce the radiation dose to as low as reasonably    achievable.         COMPARISON:    None.         HISTORY:    ORDERING SYSTEM PROVIDED HISTORY: upper abdominal and suprapubic pain    TECHNOLOGIST PROVIDED HISTORY:    Reason for exam:->upper abdominal and suprapubic pain    Additional Contrast?->None    Reason for Exam: upper abdominal and suprapubic pain    Acuity: Unknown    Type of Exam: Unknown         FINDINGS:    Lower Chest: No evidence of pneumonia or other acute findings.         Organs:  There is a low-density lesion in the posterior dome of the liver    measuring 1.8 cm.  Margins appear ill-defined.  Normal appearance of the    gallbladder.  No evidence of pancreatitis.  No ureteral stone or    hydronephrosis. No evidence of pyelonephritis.         GI/Bowel: At the ileocecal valve there is rounded increased density.  This is    not well evaluated due to collapse and lack of contrast.  Adjacent stranding    of fat appears present, and there appears to be mild wall thickening of the    adjacent cecum.  Prominent adjacent lymph nodes are present in the right    lower quadrant measuring up to 1.4 x 0.8 cm, mildly enlarged.  No bowel    obstruction.  Normal appearance of the appendix.         Pelvis: No acute abnormality of the pelvis. No evidence of cystitis.         Peritoneum/Retroperitoneum: No free air, ascites or abscess.         Bones/Soft Tissues: No fracture or other acute osseous process.              Impression    Masslike area at the ileocecal valve and adjacent cecum.  This may either be    due to malignancy or focal infection.  Adjacent metastatic versus    reactive/inflammatory mildly enlarged lymph nodes.  Colonoscopy recommended.         Indeterminate low-density lesion at the dome of the liver which may be a    metastatic focus. Tod Clos also in the differential diagnosis.  If further    imaging evaluation is needed a pre and post contrast liver mass protocol MRI    examination would be recommended.             IMPRESSION/RECOMMENDATIONS:    Active Problems:    Abdominal pain    Cecum mass  Resolved Problems:    * No resolved hospital problems. *       29 year old female with a history of anxiety and depression. She is admitted with abdominal pain. She has:    Cecal mass  - S/p laparoscopic right hemicolectomy on 9/11/2020, ulcerated cecal mass concerning for colon cancer, path pending.  - ? Liver mets, will obtain MRI abd with liver mass protocol.  - CEA 3.6. This plan was discussed with the patient and he/she verbalized understanding. Thank you for allowing us to participate in the care of this patient.      Meredith Wise, CNP  [facility] [address]  [phone]    Patient was seen this evening. Reviewed her labs and the scan. Recovering from laparoscopic right hemicolectomy. Pathology is pending. Will have MRI of the abdomen with liver protocol. Explained to the patient. Vanda Zaman.  Jonathon Lopez MD, [facility]   Hematology and Oncology  [facility]  [phone]

## 2020-09-14 NOTE — PROGRESS NOTES
PM assessment complete, VSS, evening meds given, prn morphine for pain, ambulating in the halls, pt indep. The care plan and education has been reviewed and mutually agreed upon with the patient, call light within reach, will continue to monitor.

## 2020-09-15 VITALS
WEIGHT: 186 LBS | TEMPERATURE: 97.5 F | DIASTOLIC BLOOD PRESSURE: 90 MMHG | BODY MASS INDEX: 27.55 KG/M2 | SYSTOLIC BLOOD PRESSURE: 134 MMHG | HEIGHT: 69 IN | OXYGEN SATURATION: 99 % | RESPIRATION RATE: 16 BRPM | HEART RATE: 73 BPM

## 2020-09-15 LAB
HCT VFR BLD CALC: 39.5 % (ref 36–48)
HEMOGLOBIN: 13.4 G/DL (ref 12–16)
MCH RBC QN AUTO: 32.5 PG (ref 26–34)
MCHC RBC AUTO-ENTMCNC: 33.9 G/DL (ref 31–36)
MCV RBC AUTO: 95.8 FL (ref 80–100)
PDW BLD-RTO: 13.7 % (ref 12.4–15.4)
PLATELET # BLD: 291 K/UL (ref 135–450)
PMV BLD AUTO: 9.3 FL (ref 5–10.5)
RBC # BLD: 4.12 M/UL (ref 4–5.2)
WBC # BLD: 9.1 K/UL (ref 4–11)

## 2020-09-15 PROCEDURE — 99024 POSTOP FOLLOW-UP VISIT: CPT | Performed by: SURGERY

## 2020-09-15 PROCEDURE — 6370000000 HC RX 637 (ALT 250 FOR IP): Performed by: SURGERY

## 2020-09-15 PROCEDURE — 36415 COLL VENOUS BLD VENIPUNCTURE: CPT

## 2020-09-15 PROCEDURE — 6360000002 HC RX W HCPCS: Performed by: NURSE PRACTITIONER

## 2020-09-15 PROCEDURE — APPNB30 APP NON BILLABLE TIME 0-30 MINS: Performed by: NURSE PRACTITIONER

## 2020-09-15 PROCEDURE — 6370000000 HC RX 637 (ALT 250 FOR IP): Performed by: NURSE PRACTITIONER

## 2020-09-15 PROCEDURE — 85027 COMPLETE CBC AUTOMATED: CPT

## 2020-09-15 PROCEDURE — 2580000003 HC RX 258: Performed by: SURGERY

## 2020-09-15 PROCEDURE — APPSS15 APP SPLIT SHARED TIME 0-15 MINUTES: Performed by: NURSE PRACTITIONER

## 2020-09-15 RX ORDER — OXYCODONE HYDROCHLORIDE AND ACETAMINOPHEN 5; 325 MG/1; MG/1
1 TABLET ORAL EVERY 6 HOURS PRN
Qty: 18 TABLET | Refills: 0 | Status: SHIPPED | OUTPATIENT
Start: 2020-09-15 | End: 2020-09-20

## 2020-09-15 RX ORDER — OXYCODONE HYDROCHLORIDE 5 MG/1
5 TABLET ORAL EVERY 4 HOURS PRN
Status: DISCONTINUED | OUTPATIENT
Start: 2020-09-15 | End: 2020-09-15 | Stop reason: HOSPADM

## 2020-09-15 RX ORDER — MORPHINE SULFATE 2 MG/ML
2 INJECTION, SOLUTION INTRAMUSCULAR; INTRAVENOUS EVERY 4 HOURS PRN
Status: DISCONTINUED | OUTPATIENT
Start: 2020-09-15 | End: 2020-09-15 | Stop reason: HOSPADM

## 2020-09-15 RX ADMIN — PANTOPRAZOLE SODIUM 40 MG: 40 TABLET, DELAYED RELEASE ORAL at 06:16

## 2020-09-15 RX ADMIN — ACETAMINOPHEN 1000 MG: 500 TABLET, FILM COATED ORAL at 06:17

## 2020-09-15 RX ADMIN — MORPHINE SULFATE 2 MG: 2 INJECTION, SOLUTION INTRAMUSCULAR; INTRAVENOUS at 02:21

## 2020-09-15 RX ADMIN — OXYCODONE HYDROCHLORIDE 5 MG: 5 TABLET ORAL at 10:15

## 2020-09-15 RX ADMIN — OXYCODONE HYDROCHLORIDE 5 MG: 5 TABLET ORAL at 14:54

## 2020-09-15 RX ADMIN — ACETAMINOPHEN 1000 MG: 500 TABLET, FILM COATED ORAL at 13:11

## 2020-09-15 RX ADMIN — MORPHINE SULFATE 2 MG: 2 INJECTION, SOLUTION INTRAMUSCULAR; INTRAVENOUS at 06:16

## 2020-09-15 RX ADMIN — Medication 10 ML: at 08:52

## 2020-09-15 ASSESSMENT — PAIN SCALES - GENERAL
PAINLEVEL_OUTOF10: 5
PAINLEVEL_OUTOF10: 8
PAINLEVEL_OUTOF10: 4
PAINLEVEL_OUTOF10: 8
PAINLEVEL_OUTOF10: 7
PAINLEVEL_OUTOF10: 7
PAINLEVEL_OUTOF10: 3

## 2020-09-15 ASSESSMENT — PAIN DESCRIPTION - PAIN TYPE
TYPE: SURGICAL PAIN

## 2020-09-15 ASSESSMENT — PAIN DESCRIPTION - LOCATION
LOCATION: ABDOMEN

## 2020-09-15 NOTE — PROGRESS NOTES
PM assessment complete, VSS, evening meds given, prn morphine for pain, pt ambulating well. The care plan and education has been reviewed and mutually agreed upon with the patient, call light within reach, will continue to monitor.

## 2020-09-15 NOTE — PROGRESS NOTES
[facility]        Progress Note    Patient Name: Woo Schmid  MRN: [id number]  YOB: 1985  Date of Evaluation: 9/15/2020    Subjective:  No acute events overnight  Pain controlled, \"gas\" pain better but increases with PO intake  Denies nausea or vomiting, tolerating general diet  Passing flatus and stool, remains bloated  Ambulating frequently, tolerates well  Eager to discharge home      Post-Operative Day #4    Vital Signs:  Patient Vitals for the past 24 hrs:   BP Temp Temp src Pulse Resp SpO2   09/15/20 0849 (!) 134/90 97.5 °F (36.4 °C) Axillary 73 16 99 %   09/15/20 0443 115/77 98.5 °F (36.9 °C) Oral 71 18 99 %   20 133/80 98.2 °F (36.8 °C) Oral 70 14 98 %      TEMPERATURE HISTORY 24H: Temp (24hrs), Av.1 °F (36.7 °C), Min:97.5 °F (36.4 °C), Max:98.5 °F (36.9 °C)    BLOOD PRESSURE HISTORY: Systolic (52KTG), AEJ:926 , Min:115 , JOAQUIN:961    Diastolic (41PKP), XJH:92, Min:77, Max:90      Intake/Output:  No intake/output data recorded. No intake/output data recorded. Drain/tube Output:       Physical Exam:  General: awake, alert, oriented to  person, place, time  Lungs: unlabored respirations  Abdomen: soft, mildly distended, incisional tenderness only, bowel sounds present   Skin/Wound: healing well, no drainage, no erythema, well approximated    Labs:  CBC:    Recent Labs     20  0726 20  0719 09/15/20  0739   WBC 10.2 12.9* 9.1   HGB 12.1 13.4 13.4   HCT 36.6 39.4 39.5    280 291     BMP:    Recent Labs     20  0719      K 3.8      CO2 23   BUN 5*   CREATININE 0.7   GLUCOSE 82     Hepatic:    No results for input(s): AST, ALT, ALB, BILITOT, ALKPHOS in the last 72 hours.   Amylase:  No results found for: AMYLASE  Lipase:    Lab Results   Component Value Date    LIPASE 25.0 2020    LIPASE 22.0 2020      Mag:  No results found for: MG  Phos:   No results found for: PHOS   Coags: No results found for: PROTIME, INR, APTT    Cultures:  Anaerobic culture  No results found for: LABANAE  Fungus stain  No results found for requested labs within last 30 days. Gram stain  No results found for requested labs within last 30 days. Organism  No results found for: [facility]  Surgical culture  No results found for: CXSURG  Blood culture 1  No results found for requested labs within last 30 days. Blood culture 2  No results found for requested labs within last 30 days. Fecal occult  No results found for requested labs within last 30 days. GI bacterial pathogens by PCR  No results found for requested labs within last 30 days. C. difficile  No results found for requested labs within last 30 days. Urine culture  Lab Results   Component Value Date    LABURIN  09/08/2020     <10,000 CFU/ml mixed skin/urogenital rory. No further workup       Pathology:  Pathology results pending     Imaging:  I have personally reviewed the following films:    No results found. Scheduled Meds:   acetaminophen  1,000 mg Oral 3 times per day    enoxaparin  40 mg Subcutaneous Nightly    pantoprazole  40 mg Oral QAM AC    sodium chloride flush  10 mL Intravenous 2 times per day     Continuous Infusions:   sodium chloride 50 mL/hr at 09/14/20 0946     PRN Meds:.oxyCODONE, morphine, ondansetron, sodium chloride flush, polyethylene glycol, promethazine **OR** [DISCONTINUED] ondansetron      Assessment:  30 y.o. female admitted with   1. Cecum mass    2. Liver lesion    3. Diarrhea, unspecified type    4. Abdominal pain, unspecified abdominal location        Status-post laparoscopic right hemicolectomy on 9/11/2020 for cecal mass, OR pathology pending (colonoscopy pathology with, at least, high grade dysplasia)  Liver lesion      Plan:  1. General diet as tolerated, suggested keeping meals light  2. Stop IV hydration; monitor and correct electrolytes  3. Activity as tolerated, ambulate TID, up to chair for meals  4. Pulmonary toilet, incentive spirometry  5. PRN analgesics and antiemetics--minimizing narcotics as tolerated, transition to PO  6. DVT prophylaxis with Lovenox and SCD's  7. Management of medical comorbid etiologies per primary team and consulting services  8. Disposition: Okay for discharge home from a surgical perspective; follow up with Dr. Kasie Goel in 2 weeks, will call patient with pathology results    EDUCATION:  Educated patient on plan of care and disease process--all questions answered. Plans discussed with patient and nursing. Reviewed and discussed with Dr. Kasie Goel.       Signed:  VIV Julien - CNP  9/15/2020 12:38 PM     Doing well  Path still pending  Okay for discharge home today

## 2020-09-15 NOTE — PROGRESS NOTES
AM assessment complete. Neuro checks WDL. VSS. Patient is resting in bed with call light in reach. The patient is independent and is ambulating in the halls frequently.

## 2020-09-17 ENCOUNTER — TELEPHONE (OUTPATIENT)
Dept: SURGERY | Age: 35
End: 2020-09-17

## 2020-09-19 LAB
CAMPYLOBACTER JEJUNI/COLI PCR: NOT DETECTED
CAMPYLOBACTER UPSALIENSIS: NOT DETECTED
E COLI SHIGELLA/ENTEROINVASIVE PCR: NOT DETECTED
SALMONELLA PCR: NOT DETECTED
SHIGA TOXIN I: NOT DETECTED
SHIGA TOXIN II: NOT DETECTED

## 2020-09-24 ENCOUNTER — HOSPITAL ENCOUNTER (OUTPATIENT)
Dept: MRI IMAGING | Age: 35
Discharge: HOME | End: 2020-09-24

## 2020-09-24 PROCEDURE — A9581 GADOXETATE DISODIUM INJ: HCPCS | Performed by: SURGERY

## 2020-09-24 PROCEDURE — 74183 MRI ABD W/O CNTR FLWD CNTR: CPT

## 2020-09-24 PROCEDURE — 6360000004 HC RX CONTRAST MEDICATION: Performed by: SURGERY

## 2020-09-24 RX ADMIN — GADOXETATE DISODIUM 8 ML: 181.43 INJECTION, SOLUTION INTRAVENOUS at 14:15

## 2020-09-24 ASSESSMENT — ENCOUNTER SYMPTOMS
DIARRHEA: 1
EYE ITCHING: 0
COLOR CHANGE: 0
EYE DISCHARGE: 0
NAUSEA: 1
CHEST TIGHTNESS: 0
ABDOMINAL PAIN: 1
ABDOMINAL DISTENTION: 1
APNEA: 0
BACK PAIN: 0

## 2020-09-24 NOTE — CONSULTS
Concepción Navarrete     Chief complaint-abdominal pain    HPI: 29-year-old female who presented to the emergency room 2 days ago with a 1 month history of severe, crampy epigastric abdominal pain which radiates to the right side. Nothing makes it better or worse. Associated symptoms include nausea, vomiting, chills and diarrhea. Over the last 4 to 5 months, she has been experiencing less intense symptoms which include anorexia, indigestion, bloating, belching and gas pain. No history of fevers or urinary symptoms. CAT scan showed a masslike area near the ileocecal valve. There was an ulcerated mass on colonoscopy today per Dr. Lenward Apgar. .    Past Medical History:   Diagnosis Date    Anxiety     Chronic back pain greater than 3 months duration     Depression        Past Surgical History:   Procedure Laterality Date     SECTION      COLONOSCOPY N/A 9/10/2020    COLONOSCOPY WITH BIOPSY ILEO-CECAL VALVE performed by Sandy Lang MD at [address] [facility]  9/10/2020    COLONOSCOPY POLYPECTOMY SNARE/COLD OF SIGMOID COLON POLYP performed by Sandy Lang MD at [address] Right 2020    RIGHT BOWEL RESECTION HEMICOLECTOMY LAPAROSCOPIC performed by Edie Charles MD at [address] [facility] History     Socioeconomic History    Marital status: Single     Spouse name: Not on file    Number of children: Not on file    Years of education: Not on file    Highest education level: Not on file   Occupational History    Not on file   Social Needs    Financial resource strain: Not on file    Food insecurity     Worry: Not on file     Inability: Not on file    Transportation needs     Medical: Not on file     Non-medical: Not on file   Tobacco Use    Smoking status: Current Some Day Smoker     Packs/day: 0.50     Years: 10.00     Pack years: 5.00     Types: Cigarettes     Last attempt to quit: 3/15/2010     Years since quitting: 10.5    Smokeless tobacco: itching. Respiratory: Negative for apnea and chest tightness. Cardiovascular: Negative for chest pain and leg swelling. Gastrointestinal: Positive for abdominal distention, abdominal pain, diarrhea and nausea. Endocrine: Negative for cold intolerance and heat intolerance. Genitourinary: Negative for difficulty urinating and dyspareunia. Musculoskeletal: Negative for arthralgias and back pain. Skin: Negative for color change and pallor. Allergic/Immunologic: Negative for environmental allergies and food allergies. Neurological: Negative for dizziness and facial asymmetry. Hematological: Negative for adenopathy. Does not bruise/bleed easily. Psychiatric/Behavioral: Negative for agitation and behavioral problems. Physical Exam  Constitutional:       Appearance: She is well-developed. HENT:      Head: Normocephalic and atraumatic. Right Ear: External ear normal.      Left Ear: External ear normal.   Eyes:      Conjunctiva/sclera: Conjunctivae normal.   Neck:      Musculoskeletal: Normal range of motion and neck supple. Cardiovascular:      Rate and Rhythm: Normal rate and regular rhythm. Pulmonary:      Effort: Pulmonary effort is normal.      Breath sounds: Normal breath sounds. Abdominal:      General: There is no distension. Palpations: Abdomen is soft. Tenderness: There is no abdominal tenderness. Musculoskeletal: Normal range of motion. Skin:     General: Skin is warm and dry. Neurological:      Mental Status: She is alert and oriented to person, place, and time. Psychiatric:         Behavior: Behavior normal.         Assessment:  Pleasant 58-year-old female who is had a several month history of abdominal symptoms which worsened recently. She presented to the emergency room 2 days ago with complaints of epigastric abdominal pain which radiates to the right side. There is no current tenderness on physical examination.   CAT scan of the abdomen pelvis showed a masslike area in the region of the ileocecal valve. Colonoscopy today showed an ulcerated mass in the area of concern. Biopsies are pending. CEA is in the normal range at 3.6. The overall findings are most consistent with a colon cancer. Plan: We will plan for laparoscopic right colon resection tomorrow. The patient was explained the risks, benefits and possible complications including risk of bleeding, bowel injury or anastomotic leakage.     Ck Zacarias MD  9/24/2020

## 2020-09-28 ENCOUNTER — OFFICE VISIT (OUTPATIENT)
Dept: SURGERY | Age: 35
End: 2020-09-28

## 2020-09-28 VITALS
DIASTOLIC BLOOD PRESSURE: 70 MMHG | SYSTOLIC BLOOD PRESSURE: 124 MMHG | TEMPERATURE: 97.3 F | WEIGHT: 183 LBS | BODY MASS INDEX: 27.02 KG/M2

## 2020-09-28 PROCEDURE — 99024 POSTOP FOLLOW-UP VISIT: CPT | Performed by: SURGERY

## 2020-09-28 NOTE — PROGRESS NOTES
Subjective:      Patient ID: Isabella Dave is a 30 y.o. female. HPI    Review of Systems    Objective:   Physical Exam  Abdomen soft  Incisions healing well  Assessment:      52-year-old female status post laparoscopic right colon resection. Pathology was consistent with a T3N1(2/16) moderately differentiated adenocarcinoma. MRI of the liver showed that the liver lesion was most consistent with a hemangioma. She is doing well postoperatively and has seen Dr. Mary Duarte for an oncologic evaluation. Plan: We will plan for placement of Port-A-Cath in anticipation for starting chemotherapy.         Petty Canseco MD

## 2020-10-15 NOTE — DISCHARGE SUMMARY
Hospital Medicine Discharge Summary    Patient: Danitza Pantoja     Gender: female  : 1985   Age: 30 y.o. MRN: [id number]    Admitting Physician: Cindra Essex, MD  Discharge Physician: VIV Theodore CNP     Code Status: Full Code    Admit Date: 2020   Discharge Date: 9/15/2020      Disposition:  Home    Discharge Diagnoses: Active Hospital Problems    Diagnosis Date Noted    Cecum mass [K63.89] 09/10/2020    Abdominal pain [R10.9] 2020       Follow-up appointments:  one week,path with [facility], Gen surg    Outpatient to do list: No future appointments. Condition at Discharge:  Stable    Hospital Course:   1. Cecal mass. Abnormal CT Scan showing mass like area at ICV and cecum. Colonoscopy 9/10 revealed friable ulcerated mass involving the ICV and deeper into the cecum. Pathology showed high grade dysplasia, adenocarcinoma could not be excluded. She is s/p lap right hemicolectomy , surgical pathology pending. She will f/u path with [facility]  2. Liver lesion. CT scan noted lesion in dome of liver concerning for hemangioma vs mets. Will need MRI at some point to further characterize. GI on case. 3. Leukocytosis. Improved. . Remains afebrile. Possibly reactive. Discharge Medications:   Discharge Medication List as of 9/15/2020  5:42 PM      START taking these medications    Details   oxyCODONE-acetaminophen (PERCOCET) 5-325 MG per tablet Take 1 tablet by mouth every 6 hours as needed for Pain for up to 5 days. Take lowest dose possible to manage pain; may stop taking sooner than 7 days if pain is able to be controlled with non-narcotic analgesics and therapies. , Disp-18 tablet,R-0Print           Discharge Medication List as of 9/15/2020  5:42 PM        Discharge Medication List as of 9/15/2020  5:42 PM      CONTINUE these medications which have NOT CHANGED    Details   omeprazole (PRILOSEC) 20 MG delayed release capsule Take 1 capsule by mouth Daily for 20 days, Disp-20 capsule,R-0Print      ondansetron (ZOFRAN-ODT) 4 MG disintegrating tablet Take 1 tablet by mouth every 8 hours as needed for Nausea or Vomiting, Disp-20 tablet,R-0Print           Discharge Medication List as of 9/15/2020  5:42 PM      STOP taking these medications       HYDROcodone-acetaminophen (NORCO) 5-325 MG per tablet Comments:   Reason for Stopping:         ciprofloxacin (CIPRO) 500 MG tablet Comments:   Reason for Stopping:         metroNIDAZOLE (FLAGYL) 500 MG tablet Comments:   Reason for Stopping:               Discharge ROS:  Pertinent positives as noted dc summary. All other systems reviewed and negative. Discharge Exam:    BP (!) 134/90   Pulse 73   Temp 97.5 °F (36.4 °C) (Axillary)   Resp 16   Ht 5' 9\" (1.753 m)   Wt 186 lb (84.4 kg)   SpO2 99%   BMI 27.47 kg/m²   General appearance:  NAD  HEENT:   Normal cephalic, atraumatic, moist mucous membranes, no oropharyngeal erythema or exudate  Neck: Supple, trachea midline, no anterior cervical or SC LAD  Heart[de-identified] Normal s1/s2, RRR, no murmurs, gallops, or rubs. no leg edema  Lungs:  clear bilaterally, no wheeze, no rales or rhonchi, no use of accessory musclesNormal respiratory effort. Clear to auscultation, bilaterally without Rales/Wheezes/Rhonchi. Abdomen: Soft, non-tender, non-distended, bowel sounds present, no masses  Musculoskeletal:  No clubbing, no cyanosis, no edema  Skin: No lesion or masses  Neurologic:  Neurovascularly intact without any focal sensory/motor deficits. Cranial nerves: II-XII intact, grossly non-focal.  Psychiatric:  A & O x3  Neuro: Grossly intact, moves all four extremities     Labs:  For convenience and continuity at follow-up the following most recent labs are provided:    Lab Results   Component Value Date    WBC 9.1 09/15/2020    HGB 13.4 09/15/2020    HCT 39.5 09/15/2020    MCV 95.8 09/15/2020     09/15/2020     09/14/2020    K 3.8 09/14/2020    K 3.9 09/12/2020     09/14/2020    CO2 23 09/14/2020    BUN 5 09/14/2020    CREATININE 0.7 09/14/2020    CALCIUM 9.1 09/14/2020    ALKPHOS 53 09/12/2020    ALT 14 09/12/2020    AST 21 09/12/2020    BILITOT 0.4 09/12/2020    LABALBU 4.1 09/12/2020     No results found for: INR    Radiology:  Mri Abdomen W Wo Contrast    Result Date: 9/24/2020  EXAMINATION: MRI OF THE ABDOMEN WITHOUT AND WITH CONTRAST, 9/24/2020 1:03 pm TECHNIQUE: Multiplanar multisequence MRI of the abdomen was performed without and with the administration of intravenous contrast. COMPARISON: CT abdomen/pelvis performed September 8, 2020. HISTORY: ORDERING SYSTEM PROVIDED HISTORY: Liver lesion TECHNOLOGIST PROVIDED HISTORY: Specify organ?-> liver Is the patient pregnant?-> no Reason for Exam: PT HAD ABNORMAL CT ABD HERE PLEASE EVALUATE POSSIBLE LIVER MASS OR HEMANGIOMA WITH POSSIBLE NEW DX OF COLON CA Acuity: Acute Type of Exam: Initial Additional signs and symptoms: PT HAD ABNORMAL CT ABD HERE PLEASE EVALUATE POSSIBLE LIVER MASS OR HEMANGIOMA WITH POSSIBLE NEW DX OF COLON CA Relevant Medical/Surgical History: PT HAD ABNORMAL CT ABD HERE PLEASE EVALUATE POSSIBLE LIVER MASS OR HEMANGIOMA WITH POSSIBLE NEW DX OF COLON CA FINDINGS: The liver is normal in size and signal intensity. There is a mildly T2 hyperintense focus within segment VII adjacent the IVC measuring up to 1.8 cm correlating with findings seen on prior CT. On postcontrast images, there is suggestion of slow progressive discontinuous peripheral nodular enhancement most compatible with a hemangioma. Subcentimeter mildly T2 hyperintense focus in segment IV also appears to demonstrate progressive peripheral nodular enhancement but is too small to accurately characterize. No additional liver lesions are demonstrated. The gallbladder is normally distended. The biliary tree is nondilated. The spleen, pancreas, adrenal glands, and kidneys are normal.  No hydronephrosis. The visualized small and large bowel are nondilated.   No free fluid or focal fluid collections. No significant lymphadenopathy. The abdominal aorta is normal in course and caliber. No acute soft tissue or osseous abnormality. Previously described liver lesion within segment VII on prior CT demonstrates imaging findings most compatible with a hemangioma. Additional subcentimeter focus within segment IV demonstrates similar imaging characteristics but is too small to accurately characterize. Attention on follow-up. EKG     Rhythm: normal sinus  and paroxismal supraventricular tachycardia  Rate: normal  Clinical Impression: no acute changes        The patient was seen and examined on day of discharge and this discharge summary is in conjunction with any daily progress note from day of discharge. Time Spent on discharge is 45 minutes  in the examination, evaluation, counseling and review of medications and discharge plan. Note that more than 30 minutes was spent in preparing discharge papers, discussing discharge with patient, medication review, etc.       Signed:    VIV Scales - CNP   10/15/2020      Thank you Dayo Badillo MD for the opportunity to be involved in this patient's care. If you have any questions or concerns please feel free to contact me.

## 2023-05-11 NOTE — PROGRESS NOTES
Patient's awake and alert. On room air. VSS. Patient meets criteria to be discharged from phase 1.  Will prepare for transfer to room    Electronically signed by Hermelinda Lugo RN on 9/10/2020 at 56 negative...

## 2023-08-04 ENCOUNTER — HOSPITAL ENCOUNTER (EMERGENCY)
Age: 38
Discharge: HOME | End: 2023-08-04
Attending: EMERGENCY MEDICINE
Payer: MEDICAID

## 2023-08-04 VITALS
OXYGEN SATURATION: 100 % | SYSTOLIC BLOOD PRESSURE: 133 MMHG | DIASTOLIC BLOOD PRESSURE: 84 MMHG | RESPIRATION RATE: 18 BRPM | TEMPERATURE: 98.4 F | HEART RATE: 81 BPM

## 2023-08-04 DIAGNOSIS — R11.2 NAUSEA VOMITING AND DIARRHEA: Primary | ICD-10-CM

## 2023-08-04 DIAGNOSIS — R19.7 NAUSEA VOMITING AND DIARRHEA: Primary | ICD-10-CM

## 2023-08-04 LAB
ALBUMIN SERPL-MCNC: 4.4 G/DL (ref 3.4–5)
ALBUMIN/GLOB SERPL: 1.5 {RATIO} (ref 1.1–2.2)
ALP SERPL-CCNC: 64 U/L (ref 40–129)
ALT SERPL-CCNC: 26 U/L (ref 10–40)
ANION GAP SERPL CALCULATED.3IONS-SCNC: 9 MMOL/L (ref 3–16)
AST SERPL-CCNC: 25 U/L (ref 15–37)
BACTERIA URNS QL MICRO: ABNORMAL /HPF
BASOPHILS # BLD: 0.1 K/UL (ref 0–0.2)
BASOPHILS NFR BLD: 1.3 %
BILIRUB SERPL-MCNC: <0.2 MG/DL (ref 0–1)
BILIRUB UR QL STRIP.AUTO: NEGATIVE
BUN SERPL-MCNC: 9 MG/DL (ref 7–20)
CALCIUM SERPL-MCNC: 9 MG/DL (ref 8.3–10.6)
CHLORIDE SERPL-SCNC: 106 MMOL/L (ref 99–110)
CLARITY UR: CLEAR
CO2 SERPL-SCNC: 24 MMOL/L (ref 21–32)
COLOR UR: YELLOW
CREAT SERPL-MCNC: 0.8 MG/DL (ref 0.6–1.1)
DEPRECATED RDW RBC AUTO: 14.8 % (ref 12.4–15.4)
EOSINOPHIL # BLD: 0.2 K/UL (ref 0–0.6)
EOSINOPHIL NFR BLD: 2.6 %
EPI CELLS #/AREA URNS AUTO: 6 /HPF (ref 0–5)
GFR SERPLBLD CREATININE-BSD FMLA CKD-EPI: >60 ML/MIN/{1.73_M2}
GLUCOSE SERPL-MCNC: 105 MG/DL (ref 70–99)
GLUCOSE UR STRIP.AUTO-MCNC: NEGATIVE MG/DL
HCG UR QL: NEGATIVE
HCT VFR BLD AUTO: 43.6 % (ref 36–48)
HGB BLD-MCNC: 14.3 G/DL (ref 12–16)
HGB UR QL STRIP.AUTO: ABNORMAL
HYALINE CASTS #/AREA URNS AUTO: 0 /LPF (ref 0–8)
KETONES UR STRIP.AUTO-MCNC: ABNORMAL MG/DL
LEUKOCYTE ESTERASE UR QL STRIP.AUTO: ABNORMAL
LIPASE SERPL-CCNC: 32 U/L (ref 13–60)
LYMPHOCYTES # BLD: 2.7 K/UL (ref 1–5.1)
LYMPHOCYTES NFR BLD: 36.1 %
MCH RBC QN AUTO: 31 PG (ref 26–34)
MCHC RBC AUTO-ENTMCNC: 32.8 G/DL (ref 31–36)
MCV RBC AUTO: 94.5 FL (ref 80–100)
MONOCYTES # BLD: 0.7 K/UL (ref 0–1.3)
MONOCYTES NFR BLD: 10 %
NEUTROPHILS # BLD: 3.8 K/UL (ref 1.7–7.7)
NEUTROPHILS NFR BLD: 50 %
NITRITE UR QL STRIP.AUTO: NEGATIVE
PH UR STRIP.AUTO: 5.5 [PH] (ref 5–8)
PLATELET # BLD AUTO: 309 K/UL (ref 135–450)
PMV BLD AUTO: 9.1 FL (ref 5–10.5)
POTASSIUM SERPL-SCNC: 4.7 MMOL/L (ref 3.5–5.1)
PROT SERPL-MCNC: 7.4 G/DL (ref 6.4–8.2)
PROT UR STRIP.AUTO-MCNC: 30 MG/DL
RBC # BLD AUTO: 4.62 M/UL (ref 4–5.2)
RBC CLUMPS #/AREA URNS AUTO: 11 /HPF (ref 0–4)
SODIUM SERPL-SCNC: 139 MMOL/L (ref 136–145)
SP GR UR STRIP.AUTO: 1.02 (ref 1–1.03)
UA COMPLETE W REFLEX CULTURE PNL UR: YES
UA DIPSTICK W REFLEX MICRO PNL UR: YES
URN SPEC COLLECT METH UR: ABNORMAL
UROBILINOGEN UR STRIP-ACNC: 1 E.U./DL
WBC # BLD AUTO: 7.5 K/UL (ref 4–11)
WBC #/AREA URNS AUTO: 13 /HPF (ref 0–5)

## 2023-08-04 PROCEDURE — 81001 URINALYSIS AUTO W/SCOPE: CPT

## 2023-08-04 PROCEDURE — 6360000002 HC RX W HCPCS: Performed by: EMERGENCY MEDICINE

## 2023-08-04 PROCEDURE — 96361 HYDRATE IV INFUSION ADD-ON: CPT

## 2023-08-04 PROCEDURE — 6370000000 HC RX 637 (ALT 250 FOR IP): Performed by: EMERGENCY MEDICINE

## 2023-08-04 PROCEDURE — 99284 EMERGENCY DEPT VISIT MOD MDM: CPT

## 2023-08-04 PROCEDURE — 85025 COMPLETE CBC W/AUTO DIFF WBC: CPT

## 2023-08-04 PROCEDURE — 36415 COLL VENOUS BLD VENIPUNCTURE: CPT

## 2023-08-04 PROCEDURE — 87086 URINE CULTURE/COLONY COUNT: CPT

## 2023-08-04 PROCEDURE — 80053 COMPREHEN METABOLIC PANEL: CPT

## 2023-08-04 PROCEDURE — 83690 ASSAY OF LIPASE: CPT

## 2023-08-04 PROCEDURE — 2580000003 HC RX 258: Performed by: EMERGENCY MEDICINE

## 2023-08-04 PROCEDURE — 84703 CHORIONIC GONADOTROPIN ASSAY: CPT

## 2023-08-04 PROCEDURE — 96374 THER/PROPH/DIAG INJ IV PUSH: CPT

## 2023-08-04 RX ORDER — ONDANSETRON 2 MG/ML
4 INJECTION INTRAMUSCULAR; INTRAVENOUS
Status: DISCONTINUED | OUTPATIENT
Start: 2023-08-04 | End: 2023-08-04 | Stop reason: HOSPADM

## 2023-08-04 RX ORDER — ONDANSETRON 4 MG/1
4 TABLET, ORALLY DISINTEGRATING ORAL 3 TIMES DAILY PRN
Qty: 21 TABLET | Refills: 0 | Status: SHIPPED | OUTPATIENT
Start: 2023-08-04

## 2023-08-04 RX ORDER — DICYCLOMINE HYDROCHLORIDE 10 MG/1
10 CAPSULE ORAL 3 TIMES DAILY PRN
Qty: 20 CAPSULE | Refills: 1 | Status: SHIPPED | OUTPATIENT
Start: 2023-08-04

## 2023-08-04 RX ORDER — 0.9 % SODIUM CHLORIDE 0.9 %
1000 INTRAVENOUS SOLUTION INTRAVENOUS ONCE
Status: COMPLETED | OUTPATIENT
Start: 2023-08-04 | End: 2023-08-04

## 2023-08-04 RX ORDER — DICYCLOMINE HYDROCHLORIDE 10 MG/1
10 CAPSULE ORAL ONCE
Status: COMPLETED | OUTPATIENT
Start: 2023-08-04 | End: 2023-08-04

## 2023-08-04 RX ADMIN — SODIUM CHLORIDE 1000 ML: 9 INJECTION, SOLUTION INTRAVENOUS at 06:27

## 2023-08-04 RX ADMIN — DICYCLOMINE HYDROCHLORIDE 10 MG: 10 CAPSULE ORAL at 06:23

## 2023-08-04 RX ADMIN — ONDANSETRON 4 MG: 2 INJECTION INTRAMUSCULAR; INTRAVENOUS at 06:24

## 2023-08-04 ASSESSMENT — PAIN DESCRIPTION - DESCRIPTORS: DESCRIPTORS: CRAMPING

## 2023-08-04 ASSESSMENT — PAIN SCALES - GENERAL: PAINLEVEL_OUTOF10: 5

## 2023-08-04 ASSESSMENT — PAIN DESCRIPTION - LOCATION: LOCATION: ABDOMEN

## 2023-08-04 ASSESSMENT — PAIN - FUNCTIONAL ASSESSMENT: PAIN_FUNCTIONAL_ASSESSMENT: NONE - DENIES PAIN

## 2023-08-05 LAB — BACTERIA UR CULT: NORMAL

## 2023-10-18 ENCOUNTER — APPOINTMENT (OUTPATIENT)
Dept: GENERAL RADIOLOGY | Age: 38
End: 2023-10-18
Payer: COMMERCIAL

## 2023-10-18 ENCOUNTER — HOSPITAL ENCOUNTER (EMERGENCY)
Age: 38
Discharge: HOME | End: 2023-10-18
Payer: COMMERCIAL

## 2023-10-18 VITALS
HEART RATE: 74 BPM | WEIGHT: 192 LBS | RESPIRATION RATE: 18 BRPM | DIASTOLIC BLOOD PRESSURE: 85 MMHG | BODY MASS INDEX: 28.35 KG/M2 | SYSTOLIC BLOOD PRESSURE: 168 MMHG | TEMPERATURE: 98.4 F | OXYGEN SATURATION: 99 %

## 2023-10-18 DIAGNOSIS — S52.124A CLOSED NONDISPLACED FRACTURE OF HEAD OF RIGHT RADIUS, INITIAL ENCOUNTER: Primary | ICD-10-CM

## 2023-10-18 PROCEDURE — 99283 EMERGENCY DEPT VISIT LOW MDM: CPT

## 2023-10-18 PROCEDURE — 73080 X-RAY EXAM OF ELBOW: CPT

## 2023-10-18 RX ORDER — ONDANSETRON 4 MG/1
4 TABLET, ORALLY DISINTEGRATING ORAL 3 TIMES DAILY PRN
Qty: 21 TABLET | Refills: 0 | Status: SHIPPED | OUTPATIENT
Start: 2023-10-18

## 2023-10-18 RX ORDER — HYDROCODONE BITARTRATE AND ACETAMINOPHEN 5; 325 MG/1; MG/1
1 TABLET ORAL EVERY 6 HOURS PRN
Qty: 12 TABLET | Refills: 0 | Status: SHIPPED | OUTPATIENT
Start: 2023-10-18 | End: 2023-10-21

## 2023-10-18 ASSESSMENT — ENCOUNTER SYMPTOMS
SHORTNESS OF BREATH: 0
RESPIRATORY NEGATIVE: 1
ABDOMINAL PAIN: 0
BACK PAIN: 0
COUGH: 0
NAUSEA: 0
VOMITING: 0

## 2023-10-18 ASSESSMENT — PAIN - FUNCTIONAL ASSESSMENT: PAIN_FUNCTIONAL_ASSESSMENT: 0-10

## 2023-10-18 ASSESSMENT — PAIN SCALES - GENERAL: PAINLEVEL_OUTOF10: 7

## 2023-10-18 NOTE — ED PROVIDER NOTES
[facility]        Pt Name: Clemente Araiza  MRN: [id number]  [address] [facility] 1985  Date of evaluation: 10/18/2023  Provider: JAZZY Palacios  PCP: Trang Kaye MD  Note Started: 6:26 PM EDT 10/18/23      PAU. I have evaluated this patient. CHIEF COMPLAINT       Chief Complaint   Patient presents with    Arm Injury     Tripped & fell on top of right arm this morning. Noted swelling & pain to forearm; denies numbness/tingling. Unknown tetanus. HISTORY OF PRESENT ILLNESS: 1 or more Elements     History From: Patient  Limitations to history : None    Clemente Araiza is a 41 y.o. female with past medical history of chronic back pain, anxiety and depression who presents to the ED with complaint of a right elbow injury. Patient states she tripped trying to climb over a barrier this morning. She states she was carrying something and she fell. When she fell she injured her right elbow. She is complaining of pain and swelling to the right lateral elbow. Reports decreased range of motion and strength. Has tried compression and ice today with minimal improvement in symptoms. Denies taking any over-the-counter medication for symptom control. She reports pain is a 7/10. Denies ecchymosis, erythema or warmth. Denies abrasion or laceration. Denies any numbness or tingling. Reports his left hand dominant. Denies any pain to the left wrist or hand. Denies any pain to the shoulder. Denies any head injury or other injury throughout. Nursing Notes were all reviewed and agreed with or any disagreements were addressed in the HPI. REVIEW OF SYSTEMS :      Review of Systems   Constitutional:  Positive for activity change. Negative for appetite change, chills and fever. Respiratory: Negative. Negative for cough and shortness of breath. Cardiovascular: Negative. Negative for chest pain.    Gastrointestinal:

## 2023-10-19 ENCOUNTER — TELEPHONE (OUTPATIENT)
Dept: ORTHOPEDIC SURGERY | Age: 38
End: 2023-10-19

## 2023-10-19 ENCOUNTER — OFFICE VISIT (OUTPATIENT)
Dept: ORTHOPEDIC SURGERY | Age: 38
End: 2023-10-19

## 2023-10-19 VITALS — HEIGHT: 69 IN | WEIGHT: 193 LBS | BODY MASS INDEX: 28.58 KG/M2

## 2023-10-19 DIAGNOSIS — S52.124A CLOSED NONDISPLACED FRACTURE OF HEAD OF RIGHT RADIUS, INITIAL ENCOUNTER: Primary | ICD-10-CM

## 2023-11-09 ENCOUNTER — OFFICE VISIT (OUTPATIENT)
Dept: ORTHOPEDIC SURGERY | Age: 38
End: 2023-11-09

## 2023-11-09 VITALS — BODY MASS INDEX: 28.5 KG/M2 | HEIGHT: 69 IN

## 2023-11-09 DIAGNOSIS — S52.124A CLOSED NONDISPLACED FRACTURE OF HEAD OF RIGHT RADIUS, INITIAL ENCOUNTER: Primary | ICD-10-CM

## 2024-01-01 NOTE — PROGRESS NOTES
Patient discharged to front lobby and assisted into a waiting vehicle without incident. M infant born at 40.2 weeks to a 25 year old  mother via  after eIOL. Maternal history non-pertinent. Pregnancy course complicated by decreased fetal movement on , prompting L&D visit; normal NST but recommended IOL at that time but left AMA.  Maternal blood type O+. GBS negative, HBsAg negative, HIV negative; treponema non-reactive & Rubella immune.     Delivery uncomplicated. APGAR 9 & 9 at 1 & 5 minutes respectively. Birth weight 3345 g. Erythromycin eye drops and vitamin K given. Infant blood type O+, Jared negative.    Hospital course was unremarkable. Patient passed the [facility]. Hearing test results as below. Patient is tolerating PO, voiding & stooling without any difficulties. Infant's weight loss prior to discharge within acceptable limits for age. Discharge bilirubin as noted. Discharge total serum bilirubin *** mg/dL @ *** HOL; phototherapy threshold *** mg/dLPatient is medically stable to be discharged home and will follow up with pediatrician in 24-48hrs to initiate  care.     VSS    Physical Exam  General: no acute distress, well appearing  Head: anterior fontanel open and flat  Eyes: red reflex + b/l   Ears/Nose: patent w/ no deformities  Mouth/Throat: no cleft lip or palate   Neck: no masses or lesion, no clavicular crepitus  Cardiovascular: S1 & S2, no significant murmurs, femoral pulses 2+ B/L  Respiratory: Lungs clear to auscultation bilaterally, no wheezing, rales or rhonchi; no retractions  Abdomen: soft, non-distended, BS +, no masses, no organomegaly, umbilical cord stump attached  Genitourinary: normal esau 1 external genitalia  Anus: patent   Back: no sacral dimple or tags  Musculoskeletal: moving all extremities, Ortolani/Resendiz negative  Skin: no significant lesions, no significant jaundice  Neurological: reactive; suck, grasp, pramod & Babinski reflexes +    During the hospital stay, anticipatory guidance was given to mother including back-to-sleep, handwashing,  fever, and umbilical cord care.  AAP Bright Futures handout made available to mother via hospital tablet device in room.    I discussed plan of care with mother in Kiswahili who stated understanding with verbal feedback; mother declined the use of  services.    I was physically present for the evaluation and management services provided.  I agree with the above history and discharge plan which I reviewed and edited where appropriate.  I spent 35 minutes with the patient and the patient's family on direct patient care and discharge planning.    Mee Cruz DO  Pediatric Hospitalist   M infant born at 40.2 weeks to a 25 year old  mother via  after eIOL. Maternal history non-pertinent. Pregnancy course complicated by decreased fetal movement on , prompting L&D visit; normal NST but recommended IOL at that time but left AMA.  Maternal blood type O+. GBS negative, HBsAg negative, HIV negative; treponema non-reactive & Rubella immune.     Delivery uncomplicated. APGAR 9 & 9 at 1 & 5 minutes respectively. Birth weight 3345 g. Erythromycin eye drops and vitamin K given. Infant blood type O+, Jared negative.    Hospital course was unremarkable. Patient passed the [facility]D. Hearing test results as below. Patient is tolerating PO, voiding & stooling without any difficulties. Infant's weight loss prior to discharge within acceptable limits for age. Discharge bilirubin as noted. Discharge total serum bilirubin 3.6 mg/dL @ 26 HOL.  Patient is medically stable to be discharged home and will follow up with pediatrician in 24-48hrs to initiate  care.     VSS    Physical Exam  General: no acute distress, well appearing  Head: anterior fontanel open and flat  Eyes: red reflex + b/l   Ears/Nose: patent w/ no deformities  Mouth/Throat: no cleft lip or palate   Neck: no masses or lesion, no clavicular crepitus  Cardiovascular: S1 & S2, no significant murmurs, femoral pulses 2+ B/L  Respiratory: Lungs clear to auscultation bilaterally, no wheezing, rales or rhonchi; no retractions  Abdomen: soft, non-distended, BS +, no masses, no organomegaly, umbilical cord stump attached  Genitourinary: normal esau 1 external genitalia  Anus: patent   Back: no sacral dimple or tags  Musculoskeletal: moving all extremities, Ortolani/Resendiz negative  Skin: no significant lesions, no significant jaundice  Neurological: reactive; suck, grasp, pramod & Babinski reflexes +    During the hospital stay, anticipatory guidance was given to mother including back-to-sleep, handwashing,  fever, and umbilical cord care.  AAP Bright Futures handout made available to mother via hospital tablet device in room.    I discussed plan of care with mother in Slovenian who stated understanding with verbal feedback; mother declined the use of  services.    I was physically present for the evaluation and management services provided.  I agree with the above history and discharge plan which I reviewed and edited where appropriate.  I spent 35 minutes with the patient and the patient's family on direct patient care and discharge planning.    Mee Cruz DO  Pediatric Hospitalist

## 2024-08-17 ENCOUNTER — APPOINTMENT (OUTPATIENT)
Dept: CT IMAGING | Age: 39
DRG: 720 | End: 2024-08-17
Payer: MEDICAID

## 2024-08-17 ENCOUNTER — HOSPITAL ENCOUNTER (INPATIENT)
Age: 39
LOS: 2 days | Discharge: HOME | DRG: 720 | End: 2024-08-19
Attending: INTERNAL MEDICINE | Admitting: INTERNAL MEDICINE
Payer: MEDICAID

## 2024-08-17 DIAGNOSIS — E87.1 HYPONATREMIA: ICD-10-CM

## 2024-08-17 DIAGNOSIS — R10.9 ACUTE ABDOMINAL PAIN: Primary | ICD-10-CM

## 2024-08-17 DIAGNOSIS — N39.0 ACUTE UTI: ICD-10-CM

## 2024-08-17 DIAGNOSIS — A41.9 SEPTICEMIA (HCC): ICD-10-CM

## 2024-08-17 DIAGNOSIS — A09 ACUTE INFECTIOUS DIARRHEA: ICD-10-CM

## 2024-08-17 PROBLEM — R63.0 POOR APPETITE: Status: ACTIVE | Noted: 2024-08-17

## 2024-08-17 PROBLEM — R19.7 INTRACTABLE DIARRHEA: Status: ACTIVE | Noted: 2024-08-17

## 2024-08-17 LAB
ALBUMIN SERPL-MCNC: 3.9 G/DL (ref 3.4–5)
ALBUMIN/GLOB SERPL: 1 {RATIO} (ref 1.1–2.2)
ALP SERPL-CCNC: 81 U/L (ref 40–129)
ALT SERPL-CCNC: 18 U/L (ref 10–40)
ANION GAP SERPL CALCULATED.3IONS-SCNC: 14 MMOL/L (ref 3–16)
AST SERPL-CCNC: 17 U/L (ref 15–37)
BACTERIA URNS QL MICRO: ABNORMAL /HPF
BASE EXCESS BLDV CALC-SCNC: -1.7 MMOL/L (ref -3–3)
BASOPHILS # BLD: 0 K/UL (ref 0–0.2)
BASOPHILS NFR BLD: 0.1 %
BILIRUB SERPL-MCNC: 0.5 MG/DL (ref 0–1)
BILIRUB UR QL STRIP.AUTO: NEGATIVE
BUN SERPL-MCNC: 8 MG/DL (ref 7–20)
C DIFF TOX A+B STL QL IA: NORMAL
C DIFF TOX A+B STL QL IA: NORMAL
CALCIUM SERPL-MCNC: 9.4 MG/DL (ref 8.3–10.6)
CHLORIDE SERPL-SCNC: 99 MMOL/L (ref 99–110)
CLARITY UR: ABNORMAL
CO2 BLDV-SCNC: 52 MMOL/L
CO2 SERPL-SCNC: 19 MMOL/L (ref 21–32)
COHGB MFR BLDV: 4.2 % (ref 0–1.5)
COLOR UR: YELLOW
CREAT SERPL-MCNC: 1 MG/DL (ref 0.6–1.1)
CRP SERPL-MCNC: 362.9 MG/L (ref 0–5.1)
DEPRECATED RDW RBC AUTO: 13.6 % (ref 12.4–15.4)
EOSINOPHIL # BLD: 0.1 K/UL (ref 0–0.6)
EOSINOPHIL NFR BLD: 0.3 %
EPI CELLS #/AREA URNS AUTO: 17 /HPF (ref 0–5)
ERYTHROCYTE [SEDIMENTATION RATE] IN BLOOD BY WESTERGREN METHOD: >130 MM/HR (ref 0–20)
GFR SERPLBLD CREATININE-BSD FMLA CKD-EPI: 74 ML/MIN/{1.73_M2}
GI PATHOGENS PNL STL NAA+PROBE: NORMAL
GLUCOSE SERPL-MCNC: 105 MG/DL (ref 70–99)
GLUCOSE UR STRIP.AUTO-MCNC: NEGATIVE MG/DL
HCG SERPL QL: NEGATIVE
HCO3 BLDV-SCNC: 22.1 MMOL/L (ref 23–29)
HCT VFR BLD AUTO: 40.1 % (ref 36–48)
HEMOCCULT SP1 STL QL: NORMAL
HGB BLD-MCNC: 13.2 G/DL (ref 12–16)
HGB UR QL STRIP.AUTO: ABNORMAL
HYALINE CASTS #/AREA URNS AUTO: 1 /LPF (ref 0–8)
KETONES UR STRIP.AUTO-MCNC: 40 MG/DL
LACTATE BLDV-SCNC: 0.8 MMOL/L (ref 0.4–2)
LEUKOCYTE ESTERASE UR QL STRIP.AUTO: ABNORMAL
LIPASE SERPL-CCNC: 16 U/L (ref 13–60)
LYMPHOCYTES # BLD: 1.5 K/UL (ref 1–5.1)
LYMPHOCYTES NFR BLD: 5.1 %
MAGNESIUM SERPL-MCNC: 2.4 MG/DL (ref 1.8–2.4)
MCH RBC QN AUTO: 31.3 PG (ref 26–34)
MCHC RBC AUTO-ENTMCNC: 32.9 G/DL (ref 31–36)
MCV RBC AUTO: 95.2 FL (ref 80–100)
METHGB MFR BLDV: 0.4 %
MONOCYTES # BLD: 0.9 K/UL (ref 0–1.3)
MONOCYTES NFR BLD: 3 %
NEUTROPHILS # BLD: 26 K/UL (ref 1.7–7.7)
NEUTROPHILS NFR BLD: 91.5 %
NITRITE UR QL STRIP.AUTO: NEGATIVE
O2 CT VFR BLDV CALC: 18 VOL %
O2 THERAPY: ABNORMAL
PCO2 BLDV: 33.8 MMHG (ref 40–50)
PH BLDV: 7.42 [PH] (ref 7.35–7.45)
PH UR STRIP.AUTO: 5.5 [PH] (ref 5–8)
PLATELET # BLD AUTO: 290 K/UL (ref 135–450)
PMV BLD AUTO: 8.7 FL (ref 5–10.5)
PO2 BLDV: 89.6 MMHG (ref 25–40)
POTASSIUM SERPL-SCNC: 3.5 MMOL/L (ref 3.5–5.1)
PROT SERPL-MCNC: 8 G/DL (ref 6.4–8.2)
PROT UR STRIP.AUTO-MCNC: 30 MG/DL
RBC # BLD AUTO: 4.21 M/UL (ref 4–5.2)
RBC CLUMPS #/AREA URNS AUTO: 8 /HPF (ref 0–4)
SAO2 % BLDV: 98 %
SODIUM SERPL-SCNC: 132 MMOL/L (ref 136–145)
SP GR UR STRIP.AUTO: 1.02 (ref 1–1.03)
UA COMPLETE W REFLEX CULTURE PNL UR: YES
UA DIPSTICK W REFLEX MICRO PNL UR: YES
URN SPEC COLLECT METH UR: ABNORMAL
UROBILINOGEN UR STRIP-ACNC: 1 E.U./DL
WBC # BLD AUTO: 28.4 K/UL (ref 4–11)
WBC #/AREA URNS AUTO: 59 /HPF (ref 0–5)

## 2024-08-17 PROCEDURE — 86140 C-REACTIVE PROTEIN: CPT

## 2024-08-17 PROCEDURE — 74177 CT ABD & PELVIS W/CONTRAST: CPT

## 2024-08-17 PROCEDURE — 87328 CRYPTOSPORIDIUM AG IA: CPT

## 2024-08-17 PROCEDURE — 6370000000 HC RX 637 (ALT 250 FOR IP): Performed by: INTERNAL MEDICINE

## 2024-08-17 PROCEDURE — 83735 ASSAY OF MAGNESIUM: CPT

## 2024-08-17 PROCEDURE — 87425 ROTAVIRUS AG IA: CPT

## 2024-08-17 PROCEDURE — G0378 HOSPITAL OBSERVATION PER HR: HCPCS

## 2024-08-17 PROCEDURE — 6360000002 HC RX W HCPCS: Performed by: INTERNAL MEDICINE

## 2024-08-17 PROCEDURE — 82803 BLOOD GASES ANY COMBINATION: CPT

## 2024-08-17 PROCEDURE — 87449 NOS EACH ORGANISM AG IA: CPT

## 2024-08-17 PROCEDURE — 83690 ASSAY OF LIPASE: CPT

## 2024-08-17 PROCEDURE — 96375 TX/PRO/DX INJ NEW DRUG ADDON: CPT

## 2024-08-17 PROCEDURE — 96366 THER/PROPH/DIAG IV INF ADDON: CPT

## 2024-08-17 PROCEDURE — 96372 THER/PROPH/DIAG INJ SC/IM: CPT

## 2024-08-17 PROCEDURE — 83630 LACTOFERRIN FECAL (QUAL): CPT

## 2024-08-17 PROCEDURE — 81001 URINALYSIS AUTO W/SCOPE: CPT

## 2024-08-17 PROCEDURE — 85025 COMPLETE CBC W/AUTO DIFF WBC: CPT

## 2024-08-17 PROCEDURE — 87324 CLOSTRIDIUM AG IA: CPT

## 2024-08-17 PROCEDURE — 87336 ENTAMOEB HIST DISPR AG IA: CPT

## 2024-08-17 PROCEDURE — 2500000003 HC RX 250 WO HCPCS: Performed by: INTERNAL MEDICINE

## 2024-08-17 PROCEDURE — 6360000004 HC RX CONTRAST MEDICATION: Performed by: INTERNAL MEDICINE

## 2024-08-17 PROCEDURE — 87209 SMEAR COMPLEX STAIN: CPT

## 2024-08-17 PROCEDURE — 87040 BLOOD CULTURE FOR BACTERIA: CPT

## 2024-08-17 PROCEDURE — 83605 ASSAY OF LACTIC ACID: CPT

## 2024-08-17 PROCEDURE — 96376 TX/PRO/DX INJ SAME DRUG ADON: CPT

## 2024-08-17 PROCEDURE — 85652 RBC SED RATE AUTOMATED: CPT

## 2024-08-17 PROCEDURE — 80053 COMPREHEN METABOLIC PANEL: CPT

## 2024-08-17 PROCEDURE — 2580000003 HC RX 258: Performed by: INTERNAL MEDICINE

## 2024-08-17 PROCEDURE — 87506 IADNA-DNA/RNA PROBE TQ 6-11: CPT

## 2024-08-17 PROCEDURE — 1200000000 HC SEMI PRIVATE

## 2024-08-17 PROCEDURE — 96365 THER/PROPH/DIAG IV INF INIT: CPT

## 2024-08-17 PROCEDURE — 87086 URINE CULTURE/COLONY COUNT: CPT

## 2024-08-17 PROCEDURE — 87177 OVA AND PARASITES SMEARS: CPT

## 2024-08-17 PROCEDURE — 82270 OCCULT BLOOD FECES: CPT

## 2024-08-17 PROCEDURE — 82705 FATS/LIPIDS FECES QUAL: CPT

## 2024-08-17 PROCEDURE — 84703 CHORIONIC GONADOTROPIN ASSAY: CPT

## 2024-08-17 PROCEDURE — 96361 HYDRATE IV INFUSION ADD-ON: CPT

## 2024-08-17 PROCEDURE — 99285 EMERGENCY DEPT VISIT HI MDM: CPT

## 2024-08-17 RX ORDER — OXYCODONE AND ACETAMINOPHEN 10; 325 MG/1; MG/1
1 TABLET ORAL EVERY 4 HOURS PRN
Status: DISCONTINUED | OUTPATIENT
Start: 2024-08-17 | End: 2024-08-19 | Stop reason: HOSPADM

## 2024-08-17 RX ORDER — CIPROFLOXACIN 500 MG/1
500 TABLET, FILM COATED ORAL ONCE
Status: COMPLETED | OUTPATIENT
Start: 2024-08-17 | End: 2024-08-17

## 2024-08-17 RX ORDER — ENOXAPARIN SODIUM 100 MG/ML
40 INJECTION SUBCUTANEOUS DAILY
Status: DISCONTINUED | OUTPATIENT
Start: 2024-08-17 | End: 2024-08-19 | Stop reason: HOSPADM

## 2024-08-17 RX ORDER — ONDANSETRON 4 MG/1
4 TABLET, ORALLY DISINTEGRATING ORAL EVERY 8 HOURS PRN
Status: DISCONTINUED | OUTPATIENT
Start: 2024-08-17 | End: 2024-08-19 | Stop reason: HOSPADM

## 2024-08-17 RX ORDER — IOPAMIDOL 755 MG/ML
75 INJECTION, SOLUTION INTRAVASCULAR
Status: COMPLETED | OUTPATIENT
Start: 2024-08-17 | End: 2024-08-17

## 2024-08-17 RX ORDER — DEXTROSE MONOHYDRATE, SODIUM CHLORIDE, AND POTASSIUM CHLORIDE 50; 1.49; 4.5 G/1000ML; G/1000ML; G/1000ML
INJECTION, SOLUTION INTRAVENOUS CONTINUOUS
Status: DISCONTINUED | OUTPATIENT
Start: 2024-08-17 | End: 2024-08-19 | Stop reason: HOSPADM

## 2024-08-17 RX ORDER — POTASSIUM CHLORIDE 1500 MG/1
40 TABLET, EXTENDED RELEASE ORAL PRN
Status: DISCONTINUED | OUTPATIENT
Start: 2024-08-17 | End: 2024-08-19 | Stop reason: HOSPADM

## 2024-08-17 RX ORDER — ACETAMINOPHEN 650 MG/1
650 SUPPOSITORY RECTAL EVERY 6 HOURS PRN
Status: DISCONTINUED | OUTPATIENT
Start: 2024-08-17 | End: 2024-08-19 | Stop reason: HOSPADM

## 2024-08-17 RX ORDER — ACETAMINOPHEN 325 MG/1
650 TABLET ORAL EVERY 6 HOURS PRN
Status: DISCONTINUED | OUTPATIENT
Start: 2024-08-17 | End: 2024-08-19 | Stop reason: HOSPADM

## 2024-08-17 RX ORDER — SODIUM CHLORIDE 0.9 % (FLUSH) 0.9 %
5-40 SYRINGE (ML) INJECTION EVERY 12 HOURS SCHEDULED
Status: DISCONTINUED | OUTPATIENT
Start: 2024-08-17 | End: 2024-08-19 | Stop reason: HOSPADM

## 2024-08-17 RX ORDER — HYDROMORPHONE HYDROCHLORIDE 1 MG/ML
0.25 INJECTION, SOLUTION INTRAMUSCULAR; INTRAVENOUS; SUBCUTANEOUS
Status: DISCONTINUED | OUTPATIENT
Start: 2024-08-17 | End: 2024-08-19 | Stop reason: HOSPADM

## 2024-08-17 RX ORDER — HYDROMORPHONE HYDROCHLORIDE 1 MG/ML
0.5 INJECTION, SOLUTION INTRAMUSCULAR; INTRAVENOUS; SUBCUTANEOUS ONCE
Status: COMPLETED | OUTPATIENT
Start: 2024-08-17 | End: 2024-08-17

## 2024-08-17 RX ORDER — METRONIDAZOLE 500 MG/100ML
500 INJECTION, SOLUTION INTRAVENOUS ONCE
Status: COMPLETED | OUTPATIENT
Start: 2024-08-17 | End: 2024-08-17

## 2024-08-17 RX ORDER — SODIUM CHLORIDE 0.9 % (FLUSH) 0.9 %
5-40 SYRINGE (ML) INJECTION PRN
Status: DISCONTINUED | OUTPATIENT
Start: 2024-08-17 | End: 2024-08-19 | Stop reason: HOSPADM

## 2024-08-17 RX ORDER — ONDANSETRON 2 MG/ML
4 INJECTION INTRAMUSCULAR; INTRAVENOUS EVERY 6 HOURS PRN
Status: DISCONTINUED | OUTPATIENT
Start: 2024-08-17 | End: 2024-08-19 | Stop reason: HOSPADM

## 2024-08-17 RX ORDER — FENTANYL CITRATE 50 UG/ML
50 INJECTION, SOLUTION INTRAMUSCULAR; INTRAVENOUS ONCE
Status: COMPLETED | OUTPATIENT
Start: 2024-08-17 | End: 2024-08-17

## 2024-08-17 RX ORDER — SODIUM CHLORIDE 9 MG/ML
INJECTION, SOLUTION INTRAVENOUS PRN
Status: DISCONTINUED | OUTPATIENT
Start: 2024-08-17 | End: 2024-08-19 | Stop reason: HOSPADM

## 2024-08-17 RX ORDER — POTASSIUM CHLORIDE 7.45 MG/ML
10 INJECTION INTRAVENOUS PRN
Status: DISCONTINUED | OUTPATIENT
Start: 2024-08-17 | End: 2024-08-19 | Stop reason: HOSPADM

## 2024-08-17 RX ORDER — MAGNESIUM SULFATE IN WATER 40 MG/ML
2000 INJECTION, SOLUTION INTRAVENOUS PRN
Status: DISCONTINUED | OUTPATIENT
Start: 2024-08-17 | End: 2024-08-19 | Stop reason: HOSPADM

## 2024-08-17 RX ORDER — 0.9 % SODIUM CHLORIDE 0.9 %
1000 INTRAVENOUS SOLUTION INTRAVENOUS ONCE
Status: COMPLETED | OUTPATIENT
Start: 2024-08-17 | End: 2024-08-17

## 2024-08-17 RX ORDER — HYDROMORPHONE HYDROCHLORIDE 1 MG/ML
0.5 INJECTION, SOLUTION INTRAMUSCULAR; INTRAVENOUS; SUBCUTANEOUS
Status: DISCONTINUED | OUTPATIENT
Start: 2024-08-17 | End: 2024-08-19 | Stop reason: HOSPADM

## 2024-08-17 RX ORDER — SODIUM CHLORIDE 9 MG/ML
INJECTION, SOLUTION INTRAVENOUS CONTINUOUS
Status: DISCONTINUED | OUTPATIENT
Start: 2024-08-17 | End: 2024-08-17

## 2024-08-17 RX ADMIN — HYDROMORPHONE HYDROCHLORIDE 0.5 MG: 1 INJECTION, SOLUTION INTRAMUSCULAR; INTRAVENOUS; SUBCUTANEOUS at 21:48

## 2024-08-17 RX ADMIN — IOPAMIDOL 75 ML: 755 INJECTION, SOLUTION INTRAVENOUS at 06:55

## 2024-08-17 RX ADMIN — OXYCODONE AND ACETAMINOPHEN 1 TABLET: 10; 325 TABLET ORAL at 14:34

## 2024-08-17 RX ADMIN — SODIUM CHLORIDE 1000 ML: 9 INJECTION, SOLUTION INTRAVENOUS at 07:07

## 2024-08-17 RX ADMIN — HYDROMORPHONE HYDROCHLORIDE 0.5 MG: 1 INJECTION, SOLUTION INTRAMUSCULAR; INTRAVENOUS; SUBCUTANEOUS at 18:05

## 2024-08-17 RX ADMIN — WATER 1000 MG: 1 INJECTION INTRAMUSCULAR; INTRAVENOUS; SUBCUTANEOUS at 18:07

## 2024-08-17 RX ADMIN — HYDROMORPHONE HYDROCHLORIDE 0.5 MG: 1 INJECTION, SOLUTION INTRAMUSCULAR; INTRAVENOUS; SUBCUTANEOUS at 08:32

## 2024-08-17 RX ADMIN — ENOXAPARIN SODIUM 40 MG: 100 INJECTION SUBCUTANEOUS at 13:26

## 2024-08-17 RX ADMIN — FENTANYL CITRATE 50 MCG: 0.05 INJECTION, SOLUTION INTRAMUSCULAR; INTRAVENOUS at 06:39

## 2024-08-17 RX ADMIN — METRONIDAZOLE 500 MG: 500 INJECTION, SOLUTION INTRAVENOUS at 08:34

## 2024-08-17 RX ADMIN — POTASSIUM CHLORIDE, DEXTROSE MONOHYDRATE AND SODIUM CHLORIDE: 150; 5; 450 INJECTION, SOLUTION INTRAVENOUS at 13:28

## 2024-08-17 RX ADMIN — CIPROFLOXACIN HYDROCHLORIDE 500 MG: 500 TABLET, FILM COATED ORAL at 08:36

## 2024-08-17 ASSESSMENT — PAIN DESCRIPTION - DESCRIPTORS
DESCRIPTORS: ACHING
DESCRIPTORS: JABBING
DESCRIPTORS: ACHING
DESCRIPTORS: CRAMPING;ACHING

## 2024-08-17 ASSESSMENT — PAIN SCALES - GENERAL
PAINLEVEL_OUTOF10: 0
PAINLEVEL_OUTOF10: 8
PAINLEVEL_OUTOF10: 0
PAINLEVEL_OUTOF10: 7
PAINLEVEL_OUTOF10: 8
PAINLEVEL_OUTOF10: 0
PAINLEVEL_OUTOF10: 8
PAINLEVEL_OUTOF10: 7
PAINLEVEL_OUTOF10: 0
PAINLEVEL_OUTOF10: 6

## 2024-08-17 ASSESSMENT — PAIN DESCRIPTION - ORIENTATION
ORIENTATION: MID

## 2024-08-17 ASSESSMENT — PAIN DESCRIPTION - LOCATION
LOCATION: ABDOMEN

## 2024-08-17 ASSESSMENT — PAIN DESCRIPTION - ONSET
ONSET: GRADUAL
ONSET: ON-GOING

## 2024-08-17 ASSESSMENT — PAIN SCALES - WONG BAKER
WONGBAKER_NUMERICALRESPONSE: NO HURT
WONGBAKER_NUMERICALRESPONSE: NO HURT

## 2024-08-17 ASSESSMENT — PAIN DESCRIPTION - FREQUENCY
FREQUENCY: CONTINUOUS
FREQUENCY: CONTINUOUS

## 2024-08-17 ASSESSMENT — LIFESTYLE VARIABLES
HOW MANY STANDARD DRINKS CONTAINING ALCOHOL DO YOU HAVE ON A TYPICAL DAY: 1 OR 2
HOW OFTEN DO YOU HAVE A DRINK CONTAINING ALCOHOL: 2-3 TIMES A WEEK

## 2024-08-17 ASSESSMENT — PAIN DESCRIPTION - PAIN TYPE: TYPE: ACUTE PAIN

## 2024-08-17 ASSESSMENT — PAIN - FUNCTIONAL ASSESSMENT
PAIN_FUNCTIONAL_ASSESSMENT: ACTIVITIES ARE NOT PREVENTED
PAIN_FUNCTIONAL_ASSESSMENT: 0-10
PAIN_FUNCTIONAL_ASSESSMENT: ACTIVITIES ARE NOT PREVENTED

## 2024-08-17 NOTE — H&P
Hospital Medicine History & Physical      PCP: MUSTAPHA Calero MD    Date of Admission: 2024    Date of Service: Pt seen/examined on 2024 and Admitted to Inpatient with expected LOS greater than two midnights due to medical therapy.     Chief Complaint:    Chief Complaint   Patient presents with    Diarrhea     Pt presents to ed w/ cc of diarrhea for two days alongside chills and general weakness. Pt estimates over 10 occurrences over last 48 hours. Pt also endorses abdominal tenderness.          History Of Present Illness:    The patient is a 39 y.o. 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  She denies any sick contacts.  She denies ingestion of leftovers or spoiled food.  No one else sick at home.  At the time of evaluation, still complaining of diffuse abdominal pain.  CT abdomen pelvis without acute pathology but noted for fluid-filled colon with diarrheal illness.  Laboratory workup noted for WBC of 28.4 with mild tachycardia and urinalysis positive    Past Medical History:        Diagnosis Date    Anxiety     Chronic back pain greater than 3 months duration     Depression        Past Surgical History:        Procedure Laterality Date     SECTION      COLONOSCOPY N/A 9/10/2020    COLONOSCOPY WITH BIOPSY ILEO-CECAL VALVE performed by Ban Flores MD at [facility] ENDOSCOPY    COLONOSCOPY  9/10/2020    COLONOSCOPY POLYPECTOMY SNARE/COLD OF SIGMOID COLON POLYP performed by Ban Flores MD at [facility] ENDOSCOPY    HEMICOLECTOMY Right 2020    RIGHT BOWEL RESECTION HEMICOLECTOMY LAPAROSCOPIC performed by Lester Han MD at [facility] OR       Medications Prior to Admission:    Prior to Admission medications    Medication Sig

## 2024-08-17 NOTE — ED PROVIDER NOTES
EMERGENCY MEDICINE PROVIDER NOTE    Patient Identification  Pt Name: Mejia Larsen  MRN: [id number]  Birthdate 1985  Date of evaluation: 2024  Provider: HERBER GAMEZ DO  PCP: MUSTAPHA Calero MD    Chief Complaint  Diarrhea (Pt presents to ed w/ cc of diarrhea for two days alongside chills and general weakness. Pt estimates over 10 occurrences over last 48 hours. Pt also endorses abdominal tenderness. )      HPI  (History provided by patient)  This is a 39 y.o. female who was brought in by self for generalized abdominal pain and diarrhea for the past 2 days.  Patient is also complaining of some chills and generalized weakness.  Patient tells me she has had multiple episodes of diarrhea.  She denies any recent travel.  Patient denies blood in her stool.  Patient denies nausea and vomiting.  Patient states she does have a history of hemicolectomy.  Patient denies any fever.    I have reviewed the following nursing documentation:  Allergies: Ibuprofen    Past medical history:   Past Medical History:   Diagnosis Date    Anxiety     Chronic back pain greater than 3 months duration     Depression      Past surgical history:   Past Surgical History:   Procedure Laterality Date     SECTION      COLONOSCOPY N/A 9/10/2020    COLONOSCOPY WITH BIOPSY ILEO-CECAL VALVE performed by Ban Flores MD at [facility] ENDOSCOPY    COLONOSCOPY  9/10/2020    COLONOSCOPY POLYPECTOMY SNARE/COLD OF SIGMOID COLON POLYP performed by Ban Flores MD at [facility] ENDOSCOPY    HEMICOLECTOMY Right 2020    RIGHT BOWEL RESECTION HEMICOLECTOMY LAPAROSCOPIC performed by Lester Han MD at [facility] OR       Home medications:   Previous Medications    DICYCLOMINE (BENTYL) 10 MG CAPSULE    Take 1 capsule by mouth 3 times daily as needed (abdominal cramping)    OMEPRAZOLE (PRILOSEC) 20 MG DELAYED RELEASE CAPSULE    Take 1 capsule by mouth Daily for 20 days    ONDANSETRON (ZOFRAN-ODT) 4 MG DISINTEGRATING TABLET    Take 1        Blood pressure 122/72, pulse 100, temperature 98.2 °F (36.8 °C), resp. rate 16, height 1.753 m (5' 9\"), weight 82.6 kg (182 lb), SpO2 99%, unknown if currently breastfeeding.     Disposition:  DISPOSITION Admitted 08/17/2024 10:32:49 AM  Condition at Disposition: Data Unavailable      Patient Referrals:  No follow-up provider specified.    Discharge Medications:  New Prescriptions    No medications on file       This chart was generated using the Dragon dictation system. I created this record but it may contain dictation errors given the limitations of this technology.        David Moreno DO  08/17/24 1037

## 2024-08-17 NOTE — PROGRESS NOTES
1230: patient admitted from ED. CT abdomen pelvis  noted for fluid-filled colon with diarrheal illness.Laboratory workup noted for WBC of 28.4 with mild tachycardia and urinalysis positive. Patient has a peripheral in Left AC. Having multiple loose, watery diarrhea. In c-diff rule out precautions. Admission completed as well as 4 eyes assessment, no current skin issues. On clear liquid diet that she is tolerating. No nausea, Percocet given for 7/10 pain.     1500: stool sample sent down to Lab. Neg for c-diff.       Electronically signed by Alisa Reddy RN on 8/17/2024 at 3:59 PM

## 2024-08-17 NOTE — ED NOTES
How does patient ambulate?   [x]Low Fall Risk (ambulates by themselves without support)  []Stand by assist   []Contact Guard   []Front wheel walker  []Wheelchair   []Steady  []Bed bound  []History of Lower Extremity Amputation  []Unknown, did not assess in the emergency department   How does patient take pills?  []Whole with Water  []Crushed in applesauce  []Crushed in pudding  []Other  [x]Unknown no oral medications were given in the ED  Is patient alert?   [x]Alert  []Drowsy but responds to voice  []Doesn't respond to voice but responds to painful stimuli  []Unresponsive  Is patient oriented?   [x]To person  [x]To place  [x]To time  [x]To situation  []Confused  []Agitated  [x]Follows commands  If patient is disoriented or from a Skill Nursing Facility has family been notified of admission?   []Yes   [x]No  Patient belongings?   []Cell phone  []Wallet   []Dentures  []Clothing  Any specific patient or family belongings/needs/dynamics?   N/a  Miscellaneous comments/pending orders?  N/a     If there are any additional questions please reach out to the Emergency Department.

## 2024-08-17 NOTE — PROGRESS NOTES
4 Eyes Skin Assessment     NAME:  Mejia Larsen  YOB: 1985  MEDICAL RECORD NUMBER:  [id number]    The patient is being assessed for  Admission    I agree that at least one RN has performed a thorough Head to Toe Skin Assessment on the patient. ALL assessment sites listed below have been assessed.      Areas assessed by both nurses:    Head, Face, Ears, Shoulders, Back, Chest, Arms, Elbows, Hands, and Sacrum. Buttock, Coccyx, Ischium none        Does the Patient have a Wound? No noted wound(s)       Preet Prevention initiated by RN: NO  Wound Care Orders initiated by RN: No    Pressure Injury (Stage 3,4, Unstageable, DTI, NWPT, and Complex wounds) if present, place Wound referral order by RN under : No    New Ostomies, if present place, Ostomy referral order under : No     Nurse 1 eSignature: Electronically signed by Alisa Reddy RN on 8/17/24 at 4:02 PM EDT    **SHARE this note so that the co-signing nurse can place an eSignature**    Nurse 2 eSignature: Electronically signed by VLADIMIR FORD RN on 8/17/24 at 5:24 PM EDT

## 2024-08-17 NOTE — PLAN OF CARE
Problem: ABCDS Injury Assessment  Goal: Absence of physical injury  Outcome: Progressing     Problem: Pain  Goal: Verbalizes/displays adequate comfort level or baseline comfort level  Outcome: Progressing     Problem: Discharge Planning  Goal: Discharge to home or other facility with appropriate resources  Outcome: Progressing

## 2024-08-18 LAB
ALBUMIN SERPL-MCNC: 3.5 G/DL (ref 3.4–5)
ALBUMIN/GLOB SERPL: 0.8 {RATIO} (ref 1.1–2.2)
ALP SERPL-CCNC: 82 U/L (ref 40–129)
ALT SERPL-CCNC: 28 U/L (ref 10–40)
ANION GAP SERPL CALCULATED.3IONS-SCNC: 14 MMOL/L (ref 3–16)
AST SERPL-CCNC: 91 U/L (ref 15–37)
BACTERIA UR CULT: NORMAL
BASOPHILS # BLD: 0 K/UL (ref 0–0.2)
BASOPHILS NFR BLD: 0.3 %
BILIRUB SERPL-MCNC: 0.3 MG/DL (ref 0–1)
BUN SERPL-MCNC: 4 MG/DL (ref 7–20)
CALCIUM SERPL-MCNC: 9.2 MG/DL (ref 8.3–10.6)
CHLORIDE SERPL-SCNC: 103 MMOL/L (ref 99–110)
CO2 SERPL-SCNC: 17 MMOL/L (ref 21–32)
CREAT SERPL-MCNC: 0.8 MG/DL (ref 0.6–1.1)
CRYPTOSP AG STL QL IA: NORMAL
DEPRECATED RDW RBC AUTO: 14.2 % (ref 12.4–15.4)
E HISTOLYT AG STL QL IA: NORMAL
EOSINOPHIL # BLD: 0.3 K/UL (ref 0–0.6)
EOSINOPHIL NFR BLD: 2.3 %
G LAMBLIA AG STL QL IA: NORMAL
GFR SERPLBLD CREATININE-BSD FMLA CKD-EPI: >90 ML/MIN/{1.73_M2}
GLUCOSE SERPL-MCNC: 98 MG/DL (ref 70–99)
HCT VFR BLD AUTO: 40.5 % (ref 36–48)
HGB BLD-MCNC: 13.3 G/DL (ref 12–16)
LACTOFERRIN STL QL IA: NORMAL
LYMPHOCYTES # BLD: 2.1 K/UL (ref 1–5.1)
LYMPHOCYTES NFR BLD: 15.2 %
MCH RBC QN AUTO: 31.9 PG (ref 26–34)
MCHC RBC AUTO-ENTMCNC: 32.7 G/DL (ref 31–36)
MCV RBC AUTO: 97.3 FL (ref 80–100)
MONOCYTES # BLD: 1 K/UL (ref 0–1.3)
MONOCYTES NFR BLD: 7.3 %
NEUTROPHILS # BLD: 10.3 K/UL (ref 1.7–7.7)
NEUTROPHILS NFR BLD: 74.9 %
PLATELET # BLD AUTO: 285 K/UL (ref 135–450)
PMV BLD AUTO: 8.8 FL (ref 5–10.5)
POTASSIUM SERPL-SCNC: 4.5 MMOL/L (ref 3.5–5.1)
PROT SERPL-MCNC: 7.7 G/DL (ref 6.4–8.2)
RBC # BLD AUTO: 4.17 M/UL (ref 4–5.2)
SODIUM SERPL-SCNC: 134 MMOL/L (ref 136–145)
WBC # BLD AUTO: 13.7 K/UL (ref 4–11)

## 2024-08-18 PROCEDURE — 1200000000 HC SEMI PRIVATE

## 2024-08-18 PROCEDURE — 6370000000 HC RX 637 (ALT 250 FOR IP): Performed by: INTERNAL MEDICINE

## 2024-08-18 PROCEDURE — 85025 COMPLETE CBC W/AUTO DIFF WBC: CPT

## 2024-08-18 PROCEDURE — G0378 HOSPITAL OBSERVATION PER HR: HCPCS

## 2024-08-18 PROCEDURE — 96375 TX/PRO/DX INJ NEW DRUG ADDON: CPT

## 2024-08-18 PROCEDURE — 36415 COLL VENOUS BLD VENIPUNCTURE: CPT

## 2024-08-18 PROCEDURE — 99222 1ST HOSP IP/OBS MODERATE 55: CPT | Performed by: SURGERY

## 2024-08-18 PROCEDURE — 6360000002 HC RX W HCPCS: Performed by: INTERNAL MEDICINE

## 2024-08-18 PROCEDURE — 2580000003 HC RX 258: Performed by: INTERNAL MEDICINE

## 2024-08-18 PROCEDURE — 96376 TX/PRO/DX INJ SAME DRUG ADON: CPT

## 2024-08-18 PROCEDURE — 80053 COMPREHEN METABOLIC PANEL: CPT

## 2024-08-18 PROCEDURE — 6370000000 HC RX 637 (ALT 250 FOR IP): Performed by: NURSE PRACTITIONER

## 2024-08-18 PROCEDURE — 96372 THER/PROPH/DIAG INJ SC/IM: CPT

## 2024-08-18 RX ORDER — LACTOBACILLUS RHAMNOSUS GG 10B CELL
1 CAPSULE ORAL
Status: DISCONTINUED | OUTPATIENT
Start: 2024-08-19 | End: 2024-08-19 | Stop reason: HOSPADM

## 2024-08-18 RX ORDER — SIMETHICONE 80 MG
80 TABLET,CHEWABLE ORAL ONCE
Status: COMPLETED | OUTPATIENT
Start: 2024-08-18 | End: 2024-08-18

## 2024-08-18 RX ORDER — LOPERAMIDE HCL 2 MG
4 CAPSULE ORAL 3 TIMES DAILY
Status: DISCONTINUED | OUTPATIENT
Start: 2024-08-18 | End: 2024-08-19 | Stop reason: HOSPADM

## 2024-08-18 RX ADMIN — LOPERAMIDE HYDROCHLORIDE 4 MG: 2 CAPSULE ORAL at 10:32

## 2024-08-18 RX ADMIN — SIMETHICONE 80 MG: 80 TABLET, CHEWABLE ORAL at 21:19

## 2024-08-18 RX ADMIN — HYDROMORPHONE HYDROCHLORIDE 0.5 MG: 1 INJECTION, SOLUTION INTRAMUSCULAR; INTRAVENOUS; SUBCUTANEOUS at 14:46

## 2024-08-18 RX ADMIN — SODIUM CHLORIDE, PRESERVATIVE FREE 10 ML: 5 INJECTION INTRAVENOUS at 08:22

## 2024-08-18 RX ADMIN — HYDROMORPHONE HYDROCHLORIDE 0.5 MG: 1 INJECTION, SOLUTION INTRAMUSCULAR; INTRAVENOUS; SUBCUTANEOUS at 04:57

## 2024-08-18 RX ADMIN — OXYCODONE AND ACETAMINOPHEN 1 TABLET: 10; 325 TABLET ORAL at 20:43

## 2024-08-18 RX ADMIN — HYDROMORPHONE HYDROCHLORIDE 0.5 MG: 1 INJECTION, SOLUTION INTRAMUSCULAR; INTRAVENOUS; SUBCUTANEOUS at 17:57

## 2024-08-18 RX ADMIN — LOPERAMIDE HYDROCHLORIDE 4 MG: 2 CAPSULE ORAL at 14:46

## 2024-08-18 RX ADMIN — LOPERAMIDE HYDROCHLORIDE 4 MG: 2 CAPSULE ORAL at 20:43

## 2024-08-18 RX ADMIN — WATER 1000 MG: 1 INJECTION INTRAMUSCULAR; INTRAVENOUS; SUBCUTANEOUS at 16:55

## 2024-08-18 RX ADMIN — SODIUM CHLORIDE, PRESERVATIVE FREE 10 ML: 5 INJECTION INTRAVENOUS at 20:44

## 2024-08-18 RX ADMIN — HYDROMORPHONE HYDROCHLORIDE 0.5 MG: 1 INJECTION, SOLUTION INTRAMUSCULAR; INTRAVENOUS; SUBCUTANEOUS at 11:40

## 2024-08-18 RX ADMIN — HYDROMORPHONE HYDROCHLORIDE 0.5 MG: 1 INJECTION, SOLUTION INTRAMUSCULAR; INTRAVENOUS; SUBCUTANEOUS at 08:19

## 2024-08-18 RX ADMIN — HYDROMORPHONE HYDROCHLORIDE 0.5 MG: 1 INJECTION, SOLUTION INTRAMUSCULAR; INTRAVENOUS; SUBCUTANEOUS at 01:17

## 2024-08-18 RX ADMIN — ENOXAPARIN SODIUM 40 MG: 100 INJECTION SUBCUTANEOUS at 08:18

## 2024-08-18 RX ADMIN — ONDANSETRON 4 MG: 2 INJECTION INTRAMUSCULAR; INTRAVENOUS at 17:55

## 2024-08-18 ASSESSMENT — PAIN DESCRIPTION - ONSET
ONSET: ON-GOING

## 2024-08-18 ASSESSMENT — PAIN SCALES - WONG BAKER

## 2024-08-18 ASSESSMENT — PAIN SCALES - GENERAL
PAINLEVEL_OUTOF10: 0
PAINLEVEL_OUTOF10: 8
PAINLEVEL_OUTOF10: 0
PAINLEVEL_OUTOF10: 8
PAINLEVEL_OUTOF10: 8
PAINLEVEL_OUTOF10: 0
PAINLEVEL_OUTOF10: 6
PAINLEVEL_OUTOF10: 0
PAINLEVEL_OUTOF10: 8
PAINLEVEL_OUTOF10: 0
PAINLEVEL_OUTOF10: 8
PAINLEVEL_OUTOF10: 0
PAINLEVEL_OUTOF10: 0
PAINLEVEL_OUTOF10: 8
PAINLEVEL_OUTOF10: 0

## 2024-08-18 ASSESSMENT — PAIN DESCRIPTION - DESCRIPTORS
DESCRIPTORS: ACHING
DESCRIPTORS: ACHING
DESCRIPTORS: CRAMPING;ACHING
DESCRIPTORS: CRAMPING;ACHING
DESCRIPTORS: ACHING

## 2024-08-18 ASSESSMENT — PAIN DESCRIPTION - FREQUENCY
FREQUENCY: CONTINUOUS

## 2024-08-18 ASSESSMENT — PAIN DESCRIPTION - LOCATION
LOCATION: ABDOMEN

## 2024-08-18 ASSESSMENT — PAIN DESCRIPTION - ORIENTATION
ORIENTATION: MID

## 2024-08-18 ASSESSMENT — PAIN - FUNCTIONAL ASSESSMENT
PAIN_FUNCTIONAL_ASSESSMENT: PREVENTS OR INTERFERES SOME ACTIVE ACTIVITIES AND ADLS
PAIN_FUNCTIONAL_ASSESSMENT: ACTIVITIES ARE NOT PREVENTED

## 2024-08-18 ASSESSMENT — PAIN DESCRIPTION - PAIN TYPE
TYPE: ACUTE PAIN
TYPE: ACUTE PAIN

## 2024-08-18 NOTE — CONSULTS
Depression Mother     Diabetes Mother     Depression Sister     Substance Abuse Sister     Substance Abuse Brother     Depression Maternal Aunt     Mental Illness Maternal Aunt     Substance Abuse Maternal Uncle     Substance Abuse Paternal Uncle     High Blood Pressure Maternal Grandfather     High Cholesterol Maternal Grandfather     Stroke Maternal Grandfather     Cancer Paternal Grandmother     Diabetes Paternal Grandmother     High Blood Pressure Paternal Grandmother     High Cholesterol Paternal Grandmother     Stroke Paternal Grandmother     Arthritis Paternal Grandfather     Heart Disease Paternal Grandfather     High Blood Pressure Paternal Grandfather     High Cholesterol Paternal Grandfather     Stroke Paternal Grandfather        REVIEW OF SYSTEMS:  CONSTITUTIONAL:  negative  HEENT:  negative  RESPIRATORY:  negative  CARDIOVASCULAR:  negative  GASTROINTESTINAL:  negative except for change in bowel habits and abdominal pain  GENITOURINARY:  negative  HEMATOLOGIC/LYMPHATIC:  negative  NEUROLOGICAL:  negative  SKIN: negative    PHYSICAL EXAM:  VITALS:  /86   Pulse 80   Temp 98 °F (36.7 °C) (Oral)   Resp 16   Ht 1.753 m (5' 9\")   Wt 82.6 kg (182 lb)   SpO2 99%   BMI 26.88 kg/m²   24HR INTAKE/OUTPUT:    Intake/Output Summary (Last 24 hours) at 8/18/2024 1250  Last data filed at 8/18/2024 0455  Gross per 24 hour   Intake 2735 ml   Output 1100 ml   Net 1635 ml     DRAIN/TUBE OUTPUT:     CONSTITUTIONAL:  alert, no apparent distress and normal weight  EYES:  sclera clear  ENT:  normocepalic, without obvious abnormality  NECK:  supple, symmetrical, trachea midline and no carotid bruits  LUNGS:  clear to auscultation  CARDIOVASCULAR:  regular rate and rhythm and no murmur noted  ABDOMEN:  scars noted are healed, normal bowel sounds, firm, non-distended, tenderness noted in the epigastric region, voluntary guarding absent, no masses palpated, no hepatosplenomegally and hernia absent  MUSCULOSKELETAL:  0+  two days alongside chills and general weakness. Pt estimates over 10 occurrences over last 48 hours. Pt also endorses abdominal tenderness. ) History of colon cancer. FINDINGS: Lower Chest: The lung bases are clear. Organs: There is a stable 19 mm relative low-density lesion within the right hepatic lobe, image 37 of series 2, unchanged from 09/08/2020, consistent with a benign hemangioma.  There is mild focal fatty infiltration within the liver parenchyma adjacent to the falciform ligament.  The liver is otherwise unremarkable.  The portal vein is patent.  The gallbladder is unremarkable in CT appearance.  The spleen is unremarkable.  The pancreas is normal.  The adrenal glands are unremarkable bilaterally.  The bilateral kidneys are unremarkable, without evidence of inflammatory change, renal/ureteral calculus, or hydronephrosis. GI/Bowel: Evaluation of the hollow GI tract demonstrates that the patient is status post partial right colectomy with an ileocolic anastomosis within the right lower quadrant.  The remaining colon is partially fluid-filled, suggestive of an underlying diarrheal illness.  However, there is no evidence of abnormal wall thickening, dilatation, or obstruction of the small or large bowel. Pelvis: The urinary bladder is partially collapsed, though otherwise unremarkable.  The uterus and bilateral adnexa are unremarkable.  There is no free pelvic fluid.  No pathologic pelvic lymphadenopathy is identified. Peritoneum/Retroperitoneum: No intraperitoneal free air or free fluid is identified.  No pathologic lymphadenopathy is seen.  A few small right lower quadrant lymph nodes are noted, of questionable clinical significance.  The abdominal aorta is unremarkable.  There is no evidence of ascites, omental caking, or peritoneal implants. Bones/Soft Tissues: There is minimal degenerative change throughout the visualized thoracolumbar spine.  No osteolytic or osteoblastic lesion is seen.     1. Status

## 2024-08-18 NOTE — CONSULTS
GI Consult Note      Admission Date: 8/17/2024  Hospital Day: Hospital Day: 2  Attending: Ludmila Greenwood MD  Date of service: 8/18/24    Subjective:     Chief complaint/ Reason for consult:   Diarrhea    HPI: Mejia Larsen is a 39 y.o.  female patient, who was seen at the request of Ludmila Kruger MD.    History was obtained from chart review and the patient.       Patient has a history of right colon cancer status post right hemicolectomy in 2020.  She underwent chemoradiation at .  Since surgery, patient has 2 watery bowel movements a day.   Last Thursday, patient started having bodyaches and chills.  She thought she had the flu.  By Friday, patient started having abdominal pain without nausea and vomiting.  She also had nonbloody watery bowel movements up to 10 times a day. She presented to the ER.  CT scan showed colon is partially fluid-filled consistent with underlying diarrheal disease with no evidence of bowel obstruction.  Hematuria change or free air.  By the time I saw her today, she only had 4 liquid bowel movements.  She still has intermittent lower abdominal pain.  Of note, patient gets annual colonoscopy at , most recent one was fall of 2023.    Past Surgical History:  Status-post laparoscopic right hemicolectomy on 9/11/2020 for cecal mass, OR pathology pending (colonoscopy pathology with, at least, high grade dysplasia)  Liver lesion  Colon, right, hemicolectomy:      - Adenocarcinoma, moderately differentiated (pT3, pN1), excised.  See        comment.      - 2 lymph nodes positive for metastatic adenocarcinoma (2/16).       Past Endoscopic History:  Colonoscopy Dr. Flores 9/10/2020  Friable ulcerated mass involving the ICV and deeper into  the cecum. Separate from the append orifice. Unable to  intubate ileum.  PLAN :   Await path - rush put on path  surgical consult  CEA  clears until seen by surgery    A. IV valve, biopsy:      - At least high grade

## 2024-08-18 NOTE — PROGRESS NOTES
Shift assessment complete. Vital signs stable, afebrile. Abdomen soft, non-distended, tender, active bowel sounds. Patient reports continued frequent loose/watery bowel movements. Tolerating clear liquid diet. C/o 7/10 abdominal pain. Medicated w/ IV dilaudid at this time. IVF infusing. The care plan and education has been reviewed and mutually agreed upon with the patient. Call light within reach.

## 2024-08-18 NOTE — CARE COORDINATION
Discharge Planning:     (CM) reviewed the patient's chart to assess needs. Patient's Readmission Risk Score is 6%. Patient's medical insurance is [employer]. Patient's PCP is MUSTAPHA OWENS .     No needs anticipated, at this time. CM team to follow. Staff to inform CM if additional discharge needs arise.     Electronically signed by Esperanza Young on 8/18/24 at 10:26 AM EDT

## 2024-08-18 NOTE — PROGRESS NOTES
0815: Shift assessment done, VSS, A/O. Neuro checks WNL. Reports pain 8/10. Meds given per MAR. Standard safety measures in place. The care plan and education has been reviewed and mutually agreed upon with the patient. Patient noted to have multiple watery/loose Bm's. Apparently having watery stools is normal for her since her bowel resection from 2020, her ABD pain is not normal and has not gotten better, dilaudid Q3hrs is helping patient.  Ice pack given helps subside the cramping/ jabbing pain.     0930: GI saw patient this morning with Dx: of acute viral gastroenteritis and consulted to general surgery.     1135: seen by hospitalist and added imodium- which at 1700 has been effective and has slowed down diarrhea.       1250: seen by General surgery for acute issue with recs for advancing to reg diet  and follow up with stool studies.       1500: patient showered and only reports having two watery loose BM's today. Patient showered.     1700: patient tolerating reg diet & oral fluids. Dilaudid given 4x as well as multiple ice packs for 8/10 abd pain.     Electronically signed by Alisa Reddy RN on 8/18/2024 at 5:40 PM

## 2024-08-18 NOTE — PROGRESS NOTES
Hospitalist Progress Note      PCP: MUSTAPHA Calero MD    Date of Admission: 8/17/2024    LOS: 1    Chief Complaint:   Chief Complaint   Patient presents with    Diarrhea     Pt presents to ed w/ cc of diarrhea for two days alongside chills and general weakness. Pt estimates over 10 occurrences over last 48 hours. Pt also endorses abdominal tenderness.        Case Summary:   39 y.o. 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 with neoadjuvant chemoradiation therapy 4 years ago who presents with 5-day history of generalized abdominal pains with associated profuse diarrhea, fevers with chills feeling hot and cold found to have sepsis due to UTI with intractable abdominal pain with intractable diarrhea       Active Hospital Problems    Diagnosis Date Noted    Intractable abdominal pain [R10.9] 08/17/2024    Intractable diarrhea [R19.7] 08/17/2024    Poor appetite [R63.0] 08/17/2024    Urinary tract infection [N39.0] 08/17/2024    Sepsis (HCC) [A41.9] 08/17/2024         Principal Problem:    Intractable abdominal pain with Intractable diarrhea: Patient with previous colon surgery and at baseline has loose stools but not as profound as currently.  Still with abdominal discomfort this morning and continued diarrhea.  Likely viral gastroenteritis.  Stool studies negative.  CT abdomen pelvis with no acute pathology  - Continue pain medications  - Continue hydration  - Will trial antidiarrhea medications  - If symptoms persistent, to consider GI and/or general surgery consult    Sepsis due to urinary tract infection: Improving slowly.  WBC down to 14 from 28.  Remains afebrile.  - Continue IV antibiotics for UTI    Active Problems:    Poor appetite: Advance diet as tolerated          Medications:  Reviewed  Infusion Medications    sodium chloride      dextrose 5% and 0.45% NaCl with KCl 20 mEq 100 mL/hr at 08/17/24 1328     Scheduled Medications

## 2024-08-18 NOTE — PLAN OF CARE
Problem: Discharge Planning  Goal: Discharge to home or other facility with appropriate resources  8/18/2024 0957 by Alisa Reddy, RN  Outcome: Progressing     Problem: Pain  Goal: Verbalizes/displays adequate comfort level or baseline comfort level  8/18/2024 0957 by Alisa Reddy, RN  Outcome: Progressing     Problem: ABCDS Injury Assessment  Goal: Absence of physical injury  8/18/2024 0957 by Alisa Reddy, RN  Outcome: Progressing

## 2024-08-18 NOTE — PLAN OF CARE
Problem: Discharge Planning  Goal: Discharge to home or other facility with appropriate resources  8/17/2024 2046 by Viki Piper RN  Outcome: Progressing  8/17/2024 1521 by Alisa Reddy, RN  Outcome: Progressing  Flowsheets (Taken 8/17/2024 1245)  Discharge to home or other facility with appropriate resources: Identify barriers to discharge with patient and caregiver     Problem: Pain  Goal: Verbalizes/displays adequate comfort level or baseline comfort level  8/17/2024 2046 by Viki Piper, RN  Outcome: Progressing  8/17/2024 1521 by Alisa Reddy, RN  Outcome: Progressing     Problem: ABCDS Injury Assessment  Goal: Absence of physical injury  8/17/2024 2046 by Viki Piper RN  Outcome: Progressing  8/17/2024 1521 by Alisa Reddy, RN  Outcome: Progressing

## 2024-08-19 VITALS
HEART RATE: 73 BPM | HEIGHT: 69 IN | RESPIRATION RATE: 16 BRPM | OXYGEN SATURATION: 97 % | SYSTOLIC BLOOD PRESSURE: 131 MMHG | WEIGHT: 182 LBS | BODY MASS INDEX: 26.96 KG/M2 | TEMPERATURE: 98.6 F | DIASTOLIC BLOOD PRESSURE: 77 MMHG

## 2024-08-19 LAB
ANION GAP SERPL CALCULATED.3IONS-SCNC: 14 MMOL/L (ref 3–16)
BASOPHILS # BLD: 0.1 K/UL (ref 0–0.2)
BASOPHILS NFR BLD: 0.8 %
BUN SERPL-MCNC: 4 MG/DL (ref 7–20)
CALCIUM SERPL-MCNC: 9.2 MG/DL (ref 8.3–10.6)
CHLORIDE SERPL-SCNC: 102 MMOL/L (ref 99–110)
CO2 SERPL-SCNC: 18 MMOL/L (ref 21–32)
CREAT SERPL-MCNC: 0.7 MG/DL (ref 0.6–1.1)
DEPRECATED RDW RBC AUTO: 14.1 % (ref 12.4–15.4)
EOSINOPHIL # BLD: 0.4 K/UL (ref 0–0.6)
EOSINOPHIL NFR BLD: 4 %
FAT STL QL: NORMAL
GFR SERPLBLD CREATININE-BSD FMLA CKD-EPI: >90 ML/MIN/{1.73_M2}
GLUCOSE SERPL-MCNC: 89 MG/DL (ref 70–99)
HCT VFR BLD AUTO: 39.5 % (ref 36–48)
HGB BLD-MCNC: 13.1 G/DL (ref 12–16)
LYMPHOCYTES # BLD: 2.9 K/UL (ref 1–5.1)
LYMPHOCYTES NFR BLD: 29.4 %
MCH RBC QN AUTO: 31.6 PG (ref 26–34)
MCHC RBC AUTO-ENTMCNC: 33 G/DL (ref 31–36)
MCV RBC AUTO: 95.5 FL (ref 80–100)
MONOCYTES # BLD: 0.8 K/UL (ref 0–1.3)
MONOCYTES NFR BLD: 8.2 %
NEUTRAL FAT STL QL: NORMAL
NEUTROPHILS # BLD: 5.7 K/UL (ref 1.7–7.7)
NEUTROPHILS NFR BLD: 57.6 %
PLATELET # BLD AUTO: 370 K/UL (ref 135–450)
PMV BLD AUTO: 8.8 FL (ref 5–10.5)
POTASSIUM SERPL-SCNC: 3.9 MMOL/L (ref 3.5–5.1)
RBC # BLD AUTO: 4.13 M/UL (ref 4–5.2)
RV AG STL QL IA: NEGATIVE
SODIUM SERPL-SCNC: 134 MMOL/L (ref 136–145)
WBC # BLD AUTO: 9.8 K/UL (ref 4–11)

## 2024-08-19 PROCEDURE — 96372 THER/PROPH/DIAG INJ SC/IM: CPT

## 2024-08-19 PROCEDURE — G0378 HOSPITAL OBSERVATION PER HR: HCPCS

## 2024-08-19 PROCEDURE — 96376 TX/PRO/DX INJ SAME DRUG ADON: CPT

## 2024-08-19 PROCEDURE — 6370000000 HC RX 637 (ALT 250 FOR IP): Performed by: INTERNAL MEDICINE

## 2024-08-19 PROCEDURE — 85025 COMPLETE CBC W/AUTO DIFF WBC: CPT

## 2024-08-19 PROCEDURE — APPSS15 APP SPLIT SHARED TIME 0-15 MINUTES: Performed by: NURSE PRACTITIONER

## 2024-08-19 PROCEDURE — 6360000002 HC RX W HCPCS: Performed by: INTERNAL MEDICINE

## 2024-08-19 PROCEDURE — 80048 BASIC METABOLIC PNL TOTAL CA: CPT

## 2024-08-19 PROCEDURE — 36415 COLL VENOUS BLD VENIPUNCTURE: CPT

## 2024-08-19 PROCEDURE — APPNB30 APP NON BILLABLE TIME 0-30 MINS: Performed by: NURSE PRACTITIONER

## 2024-08-19 PROCEDURE — 6370000000 HC RX 637 (ALT 250 FOR IP): Performed by: NURSE PRACTITIONER

## 2024-08-19 RX ORDER — CEPHALEXIN 500 MG/1
500 CAPSULE ORAL 3 TIMES DAILY
Qty: 6 CAPSULE | Refills: 0 | Status: SHIPPED | OUTPATIENT
Start: 2024-08-19 | End: 2024-08-21

## 2024-08-19 RX ORDER — SIMETHICONE 80 MG
80 TABLET,CHEWABLE ORAL 4 TIMES DAILY PRN
Status: DISCONTINUED | OUTPATIENT
Start: 2024-08-19 | End: 2024-08-19 | Stop reason: HOSPADM

## 2024-08-19 RX ADMIN — LOPERAMIDE HYDROCHLORIDE 4 MG: 2 CAPSULE ORAL at 08:46

## 2024-08-19 RX ADMIN — HYDROMORPHONE HYDROCHLORIDE 0.5 MG: 1 INJECTION, SOLUTION INTRAMUSCULAR; INTRAVENOUS; SUBCUTANEOUS at 02:54

## 2024-08-19 RX ADMIN — Medication 1 CAPSULE: at 08:46

## 2024-08-19 RX ADMIN — SIMETHICONE 80 MG: 80 TABLET, CHEWABLE ORAL at 04:59

## 2024-08-19 RX ADMIN — ENOXAPARIN SODIUM 40 MG: 100 INJECTION SUBCUTANEOUS at 08:46

## 2024-08-19 ASSESSMENT — PAIN SCALES - GENERAL: PAINLEVEL_OUTOF10: 0

## 2024-08-19 NOTE — PLAN OF CARE
Problem: Discharge Planning  Goal: Discharge to home or other facility with appropriate resources  8/18/2024 2047 by Viki Piper RN  Outcome: Progressing  8/18/2024 0957 by Alisa Reddy RN  Outcome: Progressing     Problem: Pain  Goal: Verbalizes/displays adequate comfort level or baseline comfort level  8/18/2024 2047 by Viki Piper RN  Outcome: Progressing  8/18/2024 0957 by Alisa Reddy RN  Outcome: Progressing     Problem: ABCDS Injury Assessment  Goal: Absence of physical injury  8/18/2024 2047 by Viki Piper RN  Outcome: Progressing  8/18/2024 0957 by Alisa Reddy, RN  Outcome: Progressing

## 2024-08-19 NOTE — PROGRESS NOTES
[facility]        Progress Note    Patient Name: Mejia Larsen  MRN: [id number]  YOB: 1985  Date of Evaluation: 2024    Chief Complaint: Abdominal pain    Subjective:  No acute events overnight  Pain significantly improved, only very mild at present  No nausea or vomiting, tolerating low fiber diet  Having baseline diarrhea, frequency has slowed to patient's normal  Resting in bed at this time      Vital Signs:  Patient Vitals for the past 24 hrs:   BP Temp Temp src Pulse Resp SpO2   24 0845 131/77 98.6 °F (37 °C) Oral 73 16 97 %   24 0500 122/79 97.3 °F (36.3 °C) Oral 64 16 96 %   24 2030 117/70 98 °F (36.7 °C) Oral 71 16 96 %   24 1757 -- -- -- -- 15 --   24 1516 -- -- -- -- 16 --   24 1446 -- -- -- -- 15 --   24 1210 -- -- -- -- 16 --      TEMPERATURE HISTORY 24H: Temp (24hrs), Av °F (36.7 °C), Min:97.3 °F (36.3 °C), Max:98.6 °F (37 °C)    BLOOD PRESSURE HISTORY: Systolic (36hrs), Av , Min:117 , Max:131    Diastolic (36hrs), Av, Min:70, Max:86      Intake/Output:  I/O last 3 completed shifts:  In: 2890 [P.O.:1400; I.V.:1490]  Out: -   No intake/output data recorded.  Drain/tube Output:       Physical Exam:  General: awake, alert, oriented to  person, place, time  Cardiovascular:  regular rate and rhythm and no murmur noted  Lungs: clear to auscultation  Abdomen: soft, non-distended, no significant tenderness noted, bowel sounds present     Labs:  CBC:    Recent Labs     24  0500 24  0506 24  0948   WBC 28.4* 13.7* 9.8   HGB 13.2 13.3 13.1   HCT 40.1 40.5 39.5    285 370     BMP:    Recent Labs     24  0500 24  0506 24  0948   * 134* 134*   K 3.5 4.5 3.9   CL 99 103 102   CO2 19* 17* 18*   BUN 8 4* 4*   CREATININE 1.0 0.8 0.7   GLUCOSE 105* 98 89     Hepatic:    Recent Labs     24  0500 24  0506   AST 17 91*   ALT 18 28   BILITOT 0.5  LABURIN  08/17/2024 04:50 AM     <50,000 CFU/ml mixed skin/urogenital rory. No further workup       Pathology:  No relevant pathology     Imaging:  I have personally reviewed the following films:    No results found.    Scheduled Meds:   loperamide  4 mg Oral TID    lactobacillus  1 capsule Oral Daily with breakfast    sodium chloride flush  5-40 mL IntraVENous 2 times per day    enoxaparin  40 mg SubCUTAneous Daily    cefTRIAXone (ROCEPHIN) IV  1,000 mg IntraVENous Q24H     Continuous Infusions:   sodium chloride      dextrose 5% and 0.45% NaCl with KCl 20 mEq 100 mL/hr at 08/17/24 1328     PRN Meds:.simethicone, sodium chloride flush, sodium chloride, potassium chloride **OR** potassium alternative oral replacement **OR** potassium chloride, magnesium sulfate, ondansetron **OR** ondansetron, acetaminophen **OR** acetaminophen, oxyCODONE-acetaminophen, HYDROmorphone **OR** HYDROmorphone      Assessment:  39 y.o. female admitted with   1. Acute abdominal pain    2. Septicemia (HCC)    3. Acute infectious diarrhea    4. Acute UTI    5. Hyponatremia        Abdominal pain, diarrhea, suspect infectious gastroenteritis  History of right colectomy for colon cancer      Plan:  1. Pain significantly improved, only very mild at present, no significant tenderness noted on exam, no nausea or vomiting, tolerating low fiber diet, having baseline diarrhea, frequency has slowed to patient's normal, vitals stable, stool studies negative; continued supportive care, no further imaging or intervention planned  2. Low fiber diet as tolerated; monitor bowel function  3. IV hydration until PO intake is adequate; monitor and correct electrolytes  4. Antibiotics  5. Activity as tolerated  6. PRN analgesics and antiemetics--minimizing narcotics as tolerated  7. DVT prophylaxis with  Lovenox  8. Management of medical comorbid etiologies per primary team and consulting services  9. Disposition: Discharge planning    EDUCATION:  Educated

## 2024-08-19 NOTE — PROGRESS NOTES
Pt resting awake in bed. States no BM overnight or this morning. States she is tolerating diet well and is hopeful for discharge today. IV noted to be leaking discussed with Dr Greenwood, ok to leave IV out. Pt is axox4 and able to answer questions and follow commands throughout assessment. The care plan and education has been reviewed and mutually agreed upon with the patient.

## 2024-08-19 NOTE — PROGRESS NOTES
CLINICAL PHARMACY NOTE: MEDS TO BEDS    Total # of Prescriptions Filled: 2   The following medications were delivered to the patient:  OMEPRAZOLE 20MG CPDR  CEPHALEXIN 500MG CAPS    Additional Documentation: Herminio GRESHAM approved to deliver medications to patient room=signed  [facility]

## 2024-08-19 NOTE — DISCHARGE SUMMARY
Hospital Medicine Discharge Summary    Patient ID: Mejia Larsen      Patient's PCP: MUSTAPHA Calero MD    Admit Date: 8/17/2024     Discharge Date:   8/19/2024     Admitting Provider: Ludmila Greenwood MD     Discharge Provider: Ludmila Greenwood MD     Discharge Diagnoses:       Active Hospital Problems    Diagnosis     Intractable abdominal pain [R10.9]     Intractable diarrhea [R19.7]     Poor appetite [R63.0]     Urinary tract infection [N39.0]     Sepsis (HCC) [A41.9]        The patient was seen and examined on day of discharge and this discharge summary is in conjunction with any daily progress note from day of discharge.    Hospital Course:   The patient is a 39 y.o. 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  Patient has ongoing loose stools      Intractable abdominal pain with Intractable diarrhea: Patient with previous colon surgery and at baseline has loose stools but not as profound as currently.  Still with abdominal discomfort this morning and continued diarrhea.  Likely viral gastroenteritis.  Stool studies negative.  CT abdomen pelvis with no acute pathology.  Abdominal pain is significantly improved and diarrhea responded to Imodium.  Patient was consulted by GI and general surgery and no further workup warranted at this time.  Outpatient follow-up with PCP in 1 to 2 weeks.     Sepsis due to urinary tract infection: Resolved with IV antibiotics.  Will discharge on 2 more days of Keflex     Active Problems:    Poor appetite: Advance diet as tolerated         Physical Exam Performed:     /77   Pulse 73

## 2024-08-21 LAB
BACTERIA BLD CULT ORG #2: NORMAL
BACTERIA BLD CULT: NORMAL

## 2024-08-23 LAB — INTERPRETATION: POSITIVE

## 2024-08-23 NOTE — PROGRESS NOTES
[facility]   Emergency Department Culture Follow-Up       Mejia Larsen (CSN: [id number]) was admitted and discharged from [facility]. Was discharged on 8/19/24 by provider Dr. Greenwood.    A stool study was positive for parasites:          -Entamoeba coli trophozoites           -E. coli 10-12uM       Treatment Course:    Patient was discharged on cephalexin.       Recommendation:    Per Dr. Aviles, the patient should be treated with metronidazole 500mg PO TID x7 days.    Follow-Up:    Dr. Aviles attempted to contact the patient. Dr. Aviles requested that the prescription above be called in to the pharmacy on file. The prescription was [facility] on 8/23/24 by Liliana Infante. Pharmacy address: [address] [place] [address]. Pharmacy phone number: [phone].    Thank you,    Liliana Infante [facility]  8/23/2024

## 2024-08-29 NOTE — PROGRESS NOTES
Physician Progress Note      PATIENT:               MARILEE BARRAGAN  [facility] #:                  [id number]  :                       1985  ADMIT DATE:       2024 4:30 AM  DISCH DATE:        2024 12:15 PM  RESPONDING  PROVIDER #:        Ludmila Greenwood MD          QUERY TEXT:    Patient admitted with viral intestinal infection. Noted documentation of   Sepsis due to UTI in H&P and progress notes.  Urine culture shows <50,000   CFU/ml mixed skin/urogenital rory. In order to support the diagnosis of UTI,   please include additional clinical indicators in your documentation.  Or   please document if the diagnosis of UTI has been ruled out after further   study.    The medical record reflects the following:  Risk Factors: 38 yo female  Clinical Indicators: UA shows rare bacteria, moderate leuko esterase, negative   nitrite; urine culture shows <50,000 CFU/ml mixed skin/urogenital rory  Treatment: UA, urine culture, Rocephin, IV fluids  Options provided:  -- UTI present as evidenced by, Please document evidence.  -- UTI was ruled out  -- Other - I will add my own diagnosis  -- Disagree - Not applicable / Not valid  -- Disagree - Clinically unable to determine / Unknown  -- Refer to Clinical Documentation Reviewer    PROVIDER RESPONSE TEXT:    UTI is present as evidenced by Urinalysis    Query created by: Yanci Lopes on 2024 8:10 AM      Electronically signed by:  Ludmila Greenwood MD 2024 7:02 AM

## 2024-09-16 PROBLEM — N39.0 URINARY TRACT INFECTION: Status: RESOLVED | Noted: 2024-08-17 | Resolved: 2024-09-16

## 2025-02-26 ENCOUNTER — APPOINTMENT (OUTPATIENT)
Dept: VASCULAR LAB | Age: 40
End: 2025-02-26

## 2025-02-26 ENCOUNTER — APPOINTMENT (OUTPATIENT)
Dept: GENERAL RADIOLOGY | Age: 40
End: 2025-02-26

## 2025-02-26 ENCOUNTER — HOSPITAL ENCOUNTER (EMERGENCY)
Age: 40
Discharge: HOME | End: 2025-02-26

## 2025-02-26 VITALS
SYSTOLIC BLOOD PRESSURE: 147 MMHG | BODY MASS INDEX: 27.48 KG/M2 | RESPIRATION RATE: 18 BRPM | HEART RATE: 55 BPM | TEMPERATURE: 98.3 F | WEIGHT: 186.1 LBS | DIASTOLIC BLOOD PRESSURE: 84 MMHG | OXYGEN SATURATION: 95 %

## 2025-02-26 DIAGNOSIS — M25.562 ACUTE PAIN OF LEFT KNEE: Primary | ICD-10-CM

## 2025-02-26 PROCEDURE — 6360000002 HC RX W HCPCS: Performed by: PHYSICIAN ASSISTANT

## 2025-02-26 PROCEDURE — 96372 THER/PROPH/DIAG INJ SC/IM: CPT

## 2025-02-26 PROCEDURE — 93971 EXTREMITY STUDY: CPT

## 2025-02-26 PROCEDURE — 73562 X-RAY EXAM OF KNEE 3: CPT

## 2025-02-26 PROCEDURE — 99284 EMERGENCY DEPT VISIT MOD MDM: CPT

## 2025-02-26 RX ORDER — PREDNISONE 20 MG/1
TABLET ORAL
Qty: 18 TABLET | Refills: 0 | Status: SHIPPED | OUTPATIENT
Start: 2025-02-26 | End: 2025-03-08

## 2025-02-26 RX ORDER — ORPHENADRINE CITRATE 30 MG/ML
60 INJECTION INTRAMUSCULAR; INTRAVENOUS ONCE
Status: COMPLETED | OUTPATIENT
Start: 2025-02-26 | End: 2025-02-26

## 2025-02-26 RX ORDER — KETOROLAC TROMETHAMINE 30 MG/ML
30 INJECTION, SOLUTION INTRAMUSCULAR; INTRAVENOUS ONCE
Status: COMPLETED | OUTPATIENT
Start: 2025-02-26 | End: 2025-02-26

## 2025-02-26 RX ADMIN — ORPHENADRINE CITRATE 60 MG: 60 INJECTION INTRAMUSCULAR; INTRAVENOUS at 11:21

## 2025-02-26 RX ADMIN — KETOROLAC TROMETHAMINE 30 MG: 30 INJECTION, SOLUTION INTRAMUSCULAR at 11:22

## 2025-02-26 ASSESSMENT — ENCOUNTER SYMPTOMS
CHEST TIGHTNESS: 0
RESPIRATORY NEGATIVE: 1
VOMITING: 0
ABDOMINAL PAIN: 0
COUGH: 0
DIARRHEA: 0
SHORTNESS OF BREATH: 0
CONSTIPATION: 0
BACK PAIN: 1
COLOR CHANGE: 0
NAUSEA: 0

## 2025-02-26 ASSESSMENT — PAIN DESCRIPTION - LOCATION: LOCATION: KNEE

## 2025-02-26 ASSESSMENT — PAIN DESCRIPTION - DESCRIPTORS: DESCRIPTORS: SHOOTING;SHARP

## 2025-02-26 ASSESSMENT — PAIN DESCRIPTION - FREQUENCY: FREQUENCY: CONTINUOUS

## 2025-02-26 ASSESSMENT — PAIN SCALES - GENERAL
PAINLEVEL_OUTOF10: 5
PAINLEVEL_OUTOF10: 4

## 2025-02-26 ASSESSMENT — PAIN DESCRIPTION - PAIN TYPE: TYPE: ACUTE PAIN

## 2025-02-26 ASSESSMENT — PAIN DESCRIPTION - ORIENTATION
ORIENTATION: LEFT
ORIENTATION: LEFT

## 2025-02-26 ASSESSMENT — PAIN - FUNCTIONAL ASSESSMENT: PAIN_FUNCTIONAL_ASSESSMENT: 0-10

## 2025-02-26 NOTE — ED PROVIDER NOTES
[facility] EMERGENCY DEPARTMENT  EMERGENCY DEPARTMENT ENCOUNTER        Pt Name: Mejia Larsen  MRN: [id number]  Birthdate 1985  Date of evaluation: 2/26/2025  Provider: JAZZY Moser  PCP: MUSTAPHA Calero MD  Note Started: 12:47 PM EST 2/26/25      PAU. I have evaluated this patient.        CHIEF COMPLAINT       Chief Complaint   Patient presents with    Knee Pain     Pt presents to the ER with the complaint of left knee swelling and pain. Pt states her sciatic nerve is \"acting up\" on the same side and is causing her discomfort down the whole left leg.        HISTORY OF PRESENT ILLNESS: 1 or more Elements     History From: Patient  Limitations to history : None    Mejia Larsen is a 39 y.o. female with past medical history of anxiety and depression who presents ED with complaint of left knee pain.  Patient reports she has had history with sciatic nerve on the left in the past.  She reports she has recently started a new job where she works 12-hour shifts.  She reports she is doing a lot of standing and walking.  She states she started having flareups of her sciatic nerve a couple weeks ago.  Now having pain and swelling to her left knee.  Reports pain mainly to the posterior aspect of her left knee.  No falls or injuries.  No bowel/bladder incontinence, urinary retention or saddle anesthesia.  No fever or chills.  No rashes or lesions.  Reports decreased range of motion and strength to the left leg secondary to pain.  Has been able to ambulate without difficulty.  Denies abdominal pain, nausea/vomiting, urinary symptoms or changes in bowel movements.  No fever or chills.  No rashes or lesions.  Has been alternating Naprosyn and Tylenol at home with minimal improvement of symptoms.    Nursing Notes were all reviewed and agreed with or any disagreements were addressed in the HPI.    REVIEW OF SYSTEMS :      Review of Systems   Constitutional:  Positive for activity change. Negative

## (undated) DEVICE — CONTROL SYRINGE LUER-LOCK TIP: Brand: MONOJECT

## (undated) DEVICE — BLANKET WRM W29.9XL79.1IN UP BODY FORC AIR MISTRAL-AIR

## (undated) DEVICE — CATHETER TRAY 16 FR 5 CC FOL ANTIREFLX SAMPLING PRT DOVER

## (undated) DEVICE — STAPLER INT L75MM H1.5X1.8X2MM STD TI 6 ROW LIN CUT

## (undated) DEVICE — PROCEDURE KIT ENDOSCP CUST

## (undated) DEVICE — SUTURE VCRL SZ 3-0 L18IN ABSRB UD L26MM SH 1/2 CIR J864D

## (undated) DEVICE — SOLUTION: ACTIFOG W/FOAM PAD 48/CS: Brand: ACTIFOG™

## (undated) DEVICE — TOWEL,OR,DSP,ST,BLUE,STD,4/PK,20PK/CS: Brand: MEDLINE

## (undated) DEVICE — TRAP SPEC RETRV CLR PLAS POLYP IN LN SUCT QUIK CTCH

## (undated) DEVICE — GOWN AURORA NONREINF LG: Brand: MEDLINE INDUSTRIES, INC.

## (undated) DEVICE — CHLORAPREP 26ML ORANGE

## (undated) DEVICE — 3M™ IOBAN™ 2 ANTIMICROBIAL INCISE DRAPE 6650EZ: Brand: IOBAN™ 2

## (undated) DEVICE — SOLUTION IV IRRIG WATER 500ML POUR BRL ST 2F7113

## (undated) DEVICE — CUTTER ENDOSCP L340MM LIN ARTC SGL STROKE FIRING ENDOPATH

## (undated) DEVICE — 30977 SEE SHARP - ENHANCED INTRAOPERATIVE LAPAROSCOPE CLEANING & DEFOGGING: Brand: 30977 SEE SHARP - ENHANCED INTRAOPERATIVE LAPAROSCOPE CLEANING & DEFOGGING

## (undated) DEVICE — SUTURE MCRYL SZ 4-0 L27IN ABSRB UD L19MM PS-2 1/2 CIR PRIM Y426H

## (undated) DEVICE — STAPLER INT H4.4X1.5MM DIA75MM 0DEG TI UNIV 6 ROW CART

## (undated) DEVICE — TROCAR: Brand: KII FIOS FIRST ENTRY

## (undated) DEVICE — TROCAR: Brand: KII® SLEEVE

## (undated) DEVICE — [HIGH FLOW INSUFFLATOR,  DO NOT USE IF PACKAGE IS DAMAGED,  KEEP DRY,  KEEP AWAY FROM SUNLIGHT,  PROTECT FROM HEAT AND RADIOACTIVE SOURCES.]: Brand: PNEUMOSURE

## (undated) DEVICE — STERILE POLYISOPRENE POWDER-FREE SURGICAL GLOVES: Brand: PROTEXIS

## (undated) DEVICE — MAJOR SET UP PK

## (undated) DEVICE — ADHESIVE SKIN CLSR 0.7ML TOP DERMBND ADV

## (undated) DEVICE — SUTURE PERMAHAND SZ 3-0 L18IN NONABSORBABLE BLK L26MM SH C013D

## (undated) DEVICE — FORCEPS BX L240CM WRK CHN 2.8MM STD CAP W/ NDL MIC MESH

## (undated) DEVICE — HYPODERMIC SAFETY NEEDLE: Brand: MAGELLAN

## (undated) DEVICE — DRAPE THER FLUID WARMING 66X44 IN FLAT SLUSH DBL DISC ORS

## (undated) DEVICE — SPONGE LAP W18XL18IN WHT COT 4 PLY FLD STRUNG RADPQ DISP ST

## (undated) DEVICE — SEALER LAP L37CM MARYLAND JAW OPN NANO COAT MULTIFUNCTIONAL

## (undated) DEVICE — NEEDLE INSUF L120MM DIA2MM DISP FOR PNEUMOPERI ENDOPATH

## (undated) DEVICE — Device

## (undated) DEVICE — SNARE ENDOSCP L240CM SHTH DIA24MM LOOP W10MM POLYP RND REINF

## (undated) DEVICE — SET VLV 3 PC AWS DISPOSABLE GRDIAN SCOPEVALET

## (undated) DEVICE — SOLUTION IV IRRIG POUR BRL 0.9% SODIUM CHL 2F7124

## (undated) DEVICE — BLADE CLIPPER GEN PURP NS

## (undated) DEVICE — DRAPE,LAP,CHOLE,W/TROUGHS,STERILE: Brand: MEDLINE

## (undated) DEVICE — SHEET,DRAPE,40X58,STERILE: Brand: MEDLINE

## (undated) DEVICE — PENCIL ES L3M BTTN SWCH S STL HEX LOK BLDE ELECTRD HOLSTER

## (undated) DEVICE — MERCY FAIRFIELD TURNOVER KIT: Brand: MEDLINE INDUSTRIES, INC.

## (undated) DEVICE — RELOAD STPL SZ 0 L45MM DIA3.5MM 0DEG STD REG TISS BLU TI

## (undated) DEVICE — BW-412T DISP COMBO CLEANING BRUSH: Brand: SINGLE USE COMBINATION CLEANING BRUSH

## (undated) DEVICE — SUTURE PDS II SZ 0 L60IN ABSRB VLT L48MM CTX 1/2 CIR Z990G